# Patient Record
Sex: FEMALE | Race: OTHER | HISPANIC OR LATINO
[De-identification: names, ages, dates, MRNs, and addresses within clinical notes are randomized per-mention and may not be internally consistent; named-entity substitution may affect disease eponyms.]

---

## 2018-05-09 ENCOUNTER — APPOINTMENT (OUTPATIENT)
Dept: ANTEPARTUM | Facility: CLINIC | Age: 33
End: 2018-05-09

## 2018-05-14 ENCOUNTER — APPOINTMENT (OUTPATIENT)
Dept: ANTEPARTUM | Facility: CLINIC | Age: 33
End: 2018-05-14

## 2018-05-21 ENCOUNTER — APPOINTMENT (OUTPATIENT)
Dept: ANTEPARTUM | Facility: CLINIC | Age: 33
End: 2018-05-21
Payer: MEDICAID

## 2018-05-21 ENCOUNTER — ASOB RESULT (OUTPATIENT)
Age: 33
End: 2018-05-21

## 2018-05-21 PROCEDURE — 76801 OB US < 14 WKS SINGLE FETUS: CPT

## 2018-05-22 ENCOUNTER — APPOINTMENT (OUTPATIENT)
Dept: ANTEPARTUM | Facility: CLINIC | Age: 33
End: 2018-05-22
Payer: MEDICAID

## 2018-05-22 ENCOUNTER — APPOINTMENT (OUTPATIENT)
Dept: MATERNAL FETAL MEDICINE | Facility: CLINIC | Age: 33
End: 2018-05-22
Payer: MEDICAID

## 2018-05-22 ENCOUNTER — OTHER (OUTPATIENT)
Age: 33
End: 2018-05-22

## 2018-05-22 VITALS
OXYGEN SATURATION: 98 % | HEART RATE: 86 BPM | BODY MASS INDEX: 36.99 KG/M2 | WEIGHT: 201 LBS | SYSTOLIC BLOOD PRESSURE: 98 MMHG | HEIGHT: 62 IN | DIASTOLIC BLOOD PRESSURE: 56 MMHG | RESPIRATION RATE: 18 BRPM

## 2018-05-22 DIAGNOSIS — Z83.3 FAMILY HISTORY OF DIABETES MELLITUS: ICD-10-CM

## 2018-05-22 DIAGNOSIS — O99.212 OBESITY COMPLICATING PREGNANCY, SECOND TRIMESTER: ICD-10-CM

## 2018-05-22 DIAGNOSIS — Z3A.14 14 WEEKS GESTATION OF PREGNANCY: ICD-10-CM

## 2018-05-22 DIAGNOSIS — Z86.711 PERSONAL HISTORY OF PULMONARY EMBOLISM: ICD-10-CM

## 2018-05-22 PROCEDURE — 99213 OFFICE O/P EST LOW 20 MIN: CPT

## 2018-05-22 PROCEDURE — 76801 OB US < 14 WKS SINGLE FETUS: CPT

## 2018-05-22 RX ORDER — VITAMIN C, CALCIUM, IRON, VITAMIN D3, VITAMIN E, VITAMIN B1, VITAMIN B2, VITAMIN B3, VITAMIN B6, FOLIC ACID, IODINE, ZINC, COPPER, DOCUSATE SODIUM, DOCOSAHEXAENOIC ACID (DHA) 27-1-50 MG
KIT ORAL
Refills: 0 | Status: ACTIVE | COMMUNITY

## 2018-05-22 RX ORDER — ISOPROPYL ALCOHOL 70 ML/100ML
SWAB TOPICAL
Qty: 1 | Refills: 1 | Status: ACTIVE | COMMUNITY
Start: 2018-05-22 | End: 1900-01-01

## 2018-06-06 ENCOUNTER — APPOINTMENT (OUTPATIENT)
Dept: ANTEPARTUM | Facility: CLINIC | Age: 33
End: 2018-06-06

## 2018-06-12 LAB
2ND TRIMESTER DATA: NORMAL
AFP PNL SERPL: NORMAL
AFP SERPL-ACNC: NORMAL
B-HCG FREE SERPL-MCNC: NORMAL
CLINICAL BIOCHEMIST REVIEW: NORMAL
INHIBIN A SERPL-MCNC: NORMAL
NOTES NTD: NORMAL
U ESTRIOL SERPL-SCNC: NORMAL

## 2018-06-19 ENCOUNTER — APPOINTMENT (OUTPATIENT)
Dept: ANTEPARTUM | Facility: CLINIC | Age: 33
End: 2018-06-19

## 2018-06-26 ENCOUNTER — ASOB RESULT (OUTPATIENT)
Age: 33
End: 2018-06-26

## 2018-06-26 ENCOUNTER — APPOINTMENT (OUTPATIENT)
Dept: ANTEPARTUM | Facility: CLINIC | Age: 33
End: 2018-06-26
Payer: MEDICAID

## 2018-06-26 PROCEDURE — 76805 OB US >/= 14 WKS SNGL FETUS: CPT

## 2018-08-13 ENCOUNTER — TRANSCRIPTION ENCOUNTER (OUTPATIENT)
Age: 33
End: 2018-08-13

## 2018-08-20 ENCOUNTER — OUTPATIENT (OUTPATIENT)
Dept: OUTPATIENT SERVICES | Facility: HOSPITAL | Age: 33
LOS: 1 days | End: 2018-08-20
Payer: COMMERCIAL

## 2018-08-20 DIAGNOSIS — O26.893 OTHER SPECIFIED PREGNANCY RELATED CONDITIONS, THIRD TRIMESTER: ICD-10-CM

## 2018-08-21 ENCOUNTER — OTHER (OUTPATIENT)
Age: 33
End: 2018-08-21

## 2018-08-21 PROCEDURE — T1013: CPT

## 2018-08-21 PROCEDURE — 80053 COMPREHEN METABOLIC PANEL: CPT

## 2018-08-21 PROCEDURE — 76819 FETAL BIOPHYS PROFIL W/O NST: CPT

## 2018-08-21 PROCEDURE — 36415 COLL VENOUS BLD VENIPUNCTURE: CPT

## 2018-08-21 PROCEDURE — 76815 OB US LIMITED FETUS(S): CPT

## 2018-08-21 PROCEDURE — 85027 COMPLETE CBC AUTOMATED: CPT

## 2018-08-21 PROCEDURE — 76805 OB US >/= 14 WKS SNGL FETUS: CPT

## 2018-08-21 PROCEDURE — 82731 ASSAY OF FETAL FIBRONECTIN: CPT

## 2018-08-21 PROCEDURE — 81001 URINALYSIS AUTO W/SCOPE: CPT

## 2018-09-05 ENCOUNTER — APPOINTMENT (OUTPATIENT)
Dept: ANTEPARTUM | Facility: CLINIC | Age: 33
End: 2018-09-05
Payer: MEDICAID

## 2018-09-05 ENCOUNTER — ASOB RESULT (OUTPATIENT)
Age: 33
End: 2018-09-05

## 2018-09-05 PROCEDURE — 76816 OB US FOLLOW-UP PER FETUS: CPT

## 2018-09-05 PROCEDURE — 76819 FETAL BIOPHYS PROFIL W/O NST: CPT

## 2018-10-04 ENCOUNTER — ASOB RESULT (OUTPATIENT)
Age: 33
End: 2018-10-04

## 2018-10-04 ENCOUNTER — APPOINTMENT (OUTPATIENT)
Dept: ANTEPARTUM | Facility: CLINIC | Age: 33
End: 2018-10-04
Payer: MEDICAID

## 2018-10-04 ENCOUNTER — APPOINTMENT (OUTPATIENT)
Dept: MATERNAL FETAL MEDICINE | Facility: CLINIC | Age: 33
End: 2018-10-04

## 2018-10-04 VITALS
BODY MASS INDEX: 39.67 KG/M2 | DIASTOLIC BLOOD PRESSURE: 62 MMHG | WEIGHT: 215.56 LBS | SYSTOLIC BLOOD PRESSURE: 110 MMHG | OXYGEN SATURATION: 98 % | RESPIRATION RATE: 16 BRPM | HEART RATE: 82 BPM | HEIGHT: 62 IN

## 2018-10-04 DIAGNOSIS — E66.9 OBESITY, UNSPECIFIED: ICD-10-CM

## 2018-10-04 PROCEDURE — 76819 FETAL BIOPHYS PROFIL W/O NST: CPT

## 2018-10-04 PROCEDURE — 76816 OB US FOLLOW-UP PER FETUS: CPT

## 2018-10-04 RX ORDER — CONTAINER,EMPTY
EACH MISCELLANEOUS
Qty: 1 | Refills: 3 | Status: ACTIVE | COMMUNITY
Start: 2018-10-04 | End: 1900-01-01

## 2018-10-04 RX ORDER — SYRINGE WITH NEEDLE, 1 ML 27GX1/2"
27G X 1/2" SYRINGE, EMPTY DISPOSABLE MISCELLANEOUS
Qty: 1 | Refills: 1 | Status: ACTIVE | COMMUNITY
Start: 2018-10-04 | End: 1900-01-01

## 2018-10-04 RX ORDER — HEPARIN SODIUM 5000 [USP'U]/ML
5000 INJECTION, SOLUTION INTRAVENOUS; SUBCUTANEOUS
Qty: 60 | Refills: 1 | Status: ACTIVE | COMMUNITY
Start: 2018-10-04 | End: 1900-01-01

## 2018-10-10 ENCOUNTER — APPOINTMENT (OUTPATIENT)
Dept: ANTEPARTUM | Facility: CLINIC | Age: 33
End: 2018-10-10
Payer: MEDICAID

## 2018-10-10 ENCOUNTER — ASOB RESULT (OUTPATIENT)
Age: 33
End: 2018-10-10

## 2018-10-10 PROCEDURE — 76815 OB US LIMITED FETUS(S): CPT

## 2018-10-10 PROCEDURE — 76818 FETAL BIOPHYS PROFILE W/NST: CPT

## 2018-10-16 ENCOUNTER — APPOINTMENT (OUTPATIENT)
Dept: ANTEPARTUM | Facility: CLINIC | Age: 33
End: 2018-10-16

## 2018-10-17 ENCOUNTER — ASOB RESULT (OUTPATIENT)
Age: 33
End: 2018-10-17

## 2018-10-17 ENCOUNTER — APPOINTMENT (OUTPATIENT)
Dept: ANTEPARTUM | Facility: CLINIC | Age: 33
End: 2018-10-17
Payer: MEDICAID

## 2018-10-17 PROCEDURE — 76815 OB US LIMITED FETUS(S): CPT

## 2018-10-17 PROCEDURE — 76819 FETAL BIOPHYS PROFIL W/O NST: CPT

## 2018-10-23 ENCOUNTER — APPOINTMENT (OUTPATIENT)
Dept: ANTEPARTUM | Facility: CLINIC | Age: 33
End: 2018-10-23

## 2018-10-24 ENCOUNTER — ASOB RESULT (OUTPATIENT)
Age: 33
End: 2018-10-24

## 2018-10-24 ENCOUNTER — APPOINTMENT (OUTPATIENT)
Dept: ANTEPARTUM | Facility: CLINIC | Age: 33
End: 2018-10-24
Payer: MEDICAID

## 2018-10-24 PROCEDURE — 76819 FETAL BIOPHYS PROFIL W/O NST: CPT

## 2018-10-24 PROCEDURE — 76816 OB US FOLLOW-UP PER FETUS: CPT

## 2018-10-25 ENCOUNTER — OUTPATIENT (OUTPATIENT)
Dept: OUTPATIENT SERVICES | Facility: HOSPITAL | Age: 33
LOS: 1 days | End: 2018-10-25
Payer: COMMERCIAL

## 2018-10-25 DIAGNOSIS — Z01.818 ENCOUNTER FOR OTHER PREPROCEDURAL EXAMINATION: ICD-10-CM

## 2018-10-25 DIAGNOSIS — O47.1 FALSE LABOR AT OR AFTER 37 COMPLETED WEEKS OF GESTATION: ICD-10-CM

## 2018-10-25 LAB
APPEARANCE UR: CLEAR — SIGNIFICANT CHANGE UP
APTT BLD: 26.4 SEC — LOW (ref 27.5–37.4)
BASOPHILS # BLD AUTO: 0 K/UL — SIGNIFICANT CHANGE UP (ref 0–0.2)
BASOPHILS NFR BLD AUTO: 0.1 % — SIGNIFICANT CHANGE UP (ref 0–2)
BILIRUB UR-MCNC: NEGATIVE — SIGNIFICANT CHANGE UP
BLD GP AB SCN SERPL QL: SIGNIFICANT CHANGE UP
COLOR SPEC: YELLOW — SIGNIFICANT CHANGE UP
DIFF PNL FLD: ABNORMAL
EOSINOPHIL # BLD AUTO: 0 K/UL — SIGNIFICANT CHANGE UP (ref 0–0.5)
EOSINOPHIL NFR BLD AUTO: 0.4 % — SIGNIFICANT CHANGE UP (ref 0–6)
EPI CELLS # UR: SIGNIFICANT CHANGE UP
GLUCOSE UR QL: NEGATIVE MG/DL — SIGNIFICANT CHANGE UP
HCT VFR BLD CALC: 36.4 % — LOW (ref 37–47)
HGB BLD-MCNC: 12.1 G/DL — SIGNIFICANT CHANGE UP (ref 12–16)
HIV 1 & 2 AB SERPL IA.RAPID: SIGNIFICANT CHANGE UP
INR BLD: 0.99 RATIO — SIGNIFICANT CHANGE UP (ref 0.88–1.16)
KETONES UR-MCNC: NEGATIVE — SIGNIFICANT CHANGE UP
LEUKOCYTE ESTERASE UR-ACNC: ABNORMAL
LYMPHOCYTES # BLD AUTO: 1.7 K/UL — SIGNIFICANT CHANGE UP (ref 1–4.8)
LYMPHOCYTES # BLD AUTO: 22.2 % — SIGNIFICANT CHANGE UP (ref 20–55)
MCHC RBC-ENTMCNC: 29.2 PG — SIGNIFICANT CHANGE UP (ref 27–31)
MCHC RBC-ENTMCNC: 33.2 G/DL — SIGNIFICANT CHANGE UP (ref 32–36)
MCV RBC AUTO: 87.9 FL — SIGNIFICANT CHANGE UP (ref 81–99)
MONOCYTES # BLD AUTO: 0.6 K/UL — SIGNIFICANT CHANGE UP (ref 0–0.8)
MONOCYTES NFR BLD AUTO: 7.9 % — SIGNIFICANT CHANGE UP (ref 3–10)
NEUTROPHILS # BLD AUTO: 5.4 K/UL — SIGNIFICANT CHANGE UP (ref 1.8–8)
NEUTROPHILS NFR BLD AUTO: 68.9 % — SIGNIFICANT CHANGE UP (ref 37–73)
NITRITE UR-MCNC: NEGATIVE — SIGNIFICANT CHANGE UP
PH UR: 7 — SIGNIFICANT CHANGE UP (ref 5–8)
PLATELET # BLD AUTO: 306 K/UL — SIGNIFICANT CHANGE UP (ref 150–400)
PROT UR-MCNC: NEGATIVE MG/DL — SIGNIFICANT CHANGE UP
PROTHROM AB SERPL-ACNC: 10.9 SEC — SIGNIFICANT CHANGE UP (ref 9.8–12.7)
RBC # BLD: 4.14 M/UL — LOW (ref 4.4–5.2)
RBC # FLD: 14.5 % — SIGNIFICANT CHANGE UP (ref 11–15.6)
RBC CASTS # UR COMP ASSIST: SIGNIFICANT CHANGE UP /HPF (ref 0–4)
SP GR SPEC: 1.01 — SIGNIFICANT CHANGE UP (ref 1.01–1.02)
T PALLIDUM AB TITR SER: NEGATIVE — SIGNIFICANT CHANGE UP
TYPE + AB SCN PNL BLD: SIGNIFICANT CHANGE UP
UROBILINOGEN FLD QL: NEGATIVE MG/DL — SIGNIFICANT CHANGE UP
WBC # BLD: 7.8 K/UL — SIGNIFICANT CHANGE UP (ref 4.8–10.8)
WBC # FLD AUTO: 7.8 K/UL — SIGNIFICANT CHANGE UP (ref 4.8–10.8)
WBC UR QL: SIGNIFICANT CHANGE UP

## 2018-10-25 PROCEDURE — 85610 PROTHROMBIN TIME: CPT

## 2018-10-25 PROCEDURE — 86901 BLOOD TYPING SEROLOGIC RH(D): CPT

## 2018-10-25 PROCEDURE — 86703 HIV-1/HIV-2 1 RESULT ANTBDY: CPT

## 2018-10-25 PROCEDURE — 86780 TREPONEMA PALLIDUM: CPT

## 2018-10-25 PROCEDURE — 85730 THROMBOPLASTIN TIME PARTIAL: CPT

## 2018-10-25 PROCEDURE — 36415 COLL VENOUS BLD VENIPUNCTURE: CPT

## 2018-10-25 PROCEDURE — 85027 COMPLETE CBC AUTOMATED: CPT

## 2018-10-25 PROCEDURE — G0463: CPT

## 2018-10-25 PROCEDURE — 81001 URINALYSIS AUTO W/SCOPE: CPT

## 2018-10-25 PROCEDURE — 86900 BLOOD TYPING SEROLOGIC ABO: CPT

## 2018-10-25 PROCEDURE — 86850 RBC ANTIBODY SCREEN: CPT

## 2018-10-30 ENCOUNTER — APPOINTMENT (OUTPATIENT)
Dept: MATERNAL FETAL MEDICINE | Facility: CLINIC | Age: 33
End: 2018-10-30
Payer: MEDICAID

## 2018-10-30 ENCOUNTER — APPOINTMENT (OUTPATIENT)
Dept: ANTEPARTUM | Facility: CLINIC | Age: 33
End: 2018-10-30
Payer: MEDICAID

## 2018-10-30 ENCOUNTER — ASOB RESULT (OUTPATIENT)
Age: 33
End: 2018-10-30

## 2018-10-30 VITALS
HEIGHT: 62 IN | BODY MASS INDEX: 41.04 KG/M2 | DIASTOLIC BLOOD PRESSURE: 66 MMHG | SYSTOLIC BLOOD PRESSURE: 102 MMHG | WEIGHT: 223 LBS

## 2018-10-30 DIAGNOSIS — Z86.711 PERSONAL HISTORY OF PULMONARY EMBOLISM: ICD-10-CM

## 2018-10-30 PROCEDURE — ZZZZZ: CPT

## 2018-10-30 PROCEDURE — 76818 FETAL BIOPHYS PROFILE W/NST: CPT

## 2018-10-30 PROCEDURE — 99215 OFFICE O/P EST HI 40 MIN: CPT

## 2018-10-30 RX ORDER — ENOXAPARIN SODIUM 100 MG/ML
40 INJECTION SUBCUTANEOUS
Qty: 3 | Refills: 3 | Status: DISCONTINUED | COMMUNITY
Start: 2018-05-22 | End: 2018-10-30

## 2018-11-05 ENCOUNTER — OUTPATIENT (OUTPATIENT)
Dept: OUTPATIENT SERVICES | Facility: HOSPITAL | Age: 33
LOS: 1 days | End: 2018-11-05
Payer: COMMERCIAL

## 2018-11-05 DIAGNOSIS — O47.1 FALSE LABOR AT OR AFTER 37 COMPLETED WEEKS OF GESTATION: ICD-10-CM

## 2018-11-05 PROCEDURE — G0463: CPT

## 2018-11-05 PROCEDURE — 59050 FETAL MONITOR W/REPORT: CPT

## 2018-11-05 PROCEDURE — 59025 FETAL NON-STRESS TEST: CPT

## 2018-11-05 PROCEDURE — T1013: CPT

## 2018-11-07 ENCOUNTER — TRANSCRIPTION ENCOUNTER (OUTPATIENT)
Age: 33
End: 2018-11-07

## 2018-11-07 ENCOUNTER — APPOINTMENT (OUTPATIENT)
Dept: ANTEPARTUM | Facility: CLINIC | Age: 33
End: 2018-11-07

## 2018-11-08 ENCOUNTER — INPATIENT (INPATIENT)
Facility: HOSPITAL | Age: 33
LOS: 1 days | Discharge: HOME | End: 2018-11-10
Attending: OBSTETRICS & GYNECOLOGY | Admitting: OBSTETRICS & GYNECOLOGY
Payer: COMMERCIAL

## 2018-11-08 ENCOUNTER — RESULT REVIEW (OUTPATIENT)
Age: 33
End: 2018-11-08

## 2018-11-08 VITALS — HEIGHT: 63 IN | WEIGHT: 213.85 LBS

## 2018-11-08 DIAGNOSIS — O47.1 FALSE LABOR AT OR AFTER 37 COMPLETED WEEKS OF GESTATION: ICD-10-CM

## 2018-11-08 LAB
APTT BLD: 28.4 SEC — SIGNIFICANT CHANGE UP (ref 27.5–36.3)
BASE EXCESS BLDCOV CALC-SCNC: -3.1 MMOL/L — LOW (ref -2–2)
BLD GP AB SCN SERPL QL: SIGNIFICANT CHANGE UP
GAS PNL BLDCOV: 7.31 — SIGNIFICANT CHANGE UP (ref 7.25–7.45)
HCO3 BLDCOV-SCNC: 21 MMOL/L — SIGNIFICANT CHANGE UP (ref 21–29)
INR BLD: 1.02 RATIO — SIGNIFICANT CHANGE UP (ref 0.88–1.16)
PCO2 BLDCOV: 47.4 MMHG — SIGNIFICANT CHANGE UP (ref 29–53)
PO2 BLDCOA: 22.1 MMHG — SIGNIFICANT CHANGE UP (ref 17–41)
PROTHROM AB SERPL-ACNC: 11.7 SEC — SIGNIFICANT CHANGE UP (ref 10–12.9)
SAO2 % BLDCOV: SIGNIFICANT CHANGE UP
TYPE + AB SCN PNL BLD: SIGNIFICANT CHANGE UP

## 2018-11-08 PROCEDURE — 88302 TISSUE EXAM BY PATHOLOGIST: CPT | Mod: 26

## 2018-11-08 PROCEDURE — 88305 TISSUE EXAM BY PATHOLOGIST: CPT | Mod: 26

## 2018-11-08 RX ORDER — SODIUM CHLORIDE 9 MG/ML
1000 INJECTION, SOLUTION INTRAVENOUS
Qty: 0 | Refills: 0 | Status: DISCONTINUED | OUTPATIENT
Start: 2018-11-08 | End: 2018-11-09

## 2018-11-08 RX ORDER — SODIUM CHLORIDE 9 MG/ML
1000 INJECTION, SOLUTION INTRAVENOUS ONCE
Qty: 0 | Refills: 0 | Status: DISCONTINUED | OUTPATIENT
Start: 2018-11-08 | End: 2018-11-08

## 2018-11-08 RX ORDER — OXYCODONE AND ACETAMINOPHEN 5; 325 MG/1; MG/1
2 TABLET ORAL EVERY 6 HOURS
Qty: 0 | Refills: 0 | Status: DISCONTINUED | OUTPATIENT
Start: 2018-11-08 | End: 2018-11-10

## 2018-11-08 RX ORDER — OXYCODONE AND ACETAMINOPHEN 5; 325 MG/1; MG/1
1 TABLET ORAL
Qty: 0 | Refills: 0 | Status: DISCONTINUED | OUTPATIENT
Start: 2018-11-08 | End: 2018-11-10

## 2018-11-08 RX ORDER — METOCLOPRAMIDE HCL 10 MG
10 TABLET ORAL ONCE
Qty: 0 | Refills: 0 | Status: DISCONTINUED | OUTPATIENT
Start: 2018-11-08 | End: 2018-11-08

## 2018-11-08 RX ORDER — DIPHENHYDRAMINE HCL 50 MG
25 CAPSULE ORAL EVERY 6 HOURS
Qty: 0 | Refills: 0 | Status: DISCONTINUED | OUTPATIENT
Start: 2018-11-08 | End: 2018-11-10

## 2018-11-08 RX ORDER — TETANUS TOXOID, REDUCED DIPHTHERIA TOXOID AND ACELLULAR PERTUSSIS VACCINE, ADSORBED 5; 2.5; 8; 8; 2.5 [IU]/.5ML; [IU]/.5ML; UG/.5ML; UG/.5ML; UG/.5ML
0.5 SUSPENSION INTRAMUSCULAR ONCE
Qty: 0 | Refills: 0 | Status: COMPLETED | OUTPATIENT
Start: 2018-11-08

## 2018-11-08 RX ORDER — ENOXAPARIN SODIUM 100 MG/ML
40 INJECTION SUBCUTANEOUS DAILY
Qty: 0 | Refills: 0 | Status: DISCONTINUED | OUTPATIENT
Start: 2018-11-09 | End: 2018-11-10

## 2018-11-08 RX ORDER — SODIUM CHLORIDE 9 MG/ML
1000 INJECTION, SOLUTION INTRAVENOUS
Qty: 0 | Refills: 0 | Status: DISCONTINUED | OUTPATIENT
Start: 2018-11-08 | End: 2018-11-08

## 2018-11-08 RX ORDER — KETOROLAC TROMETHAMINE 30 MG/ML
30 SYRINGE (ML) INJECTION ONCE
Qty: 0 | Refills: 0 | Status: DISCONTINUED | OUTPATIENT
Start: 2018-11-08 | End: 2018-11-08

## 2018-11-08 RX ORDER — CITRIC ACID/SODIUM CITRATE 300-500 MG
30 SOLUTION, ORAL ORAL ONCE
Qty: 0 | Refills: 0 | Status: DISCONTINUED | OUTPATIENT
Start: 2018-11-08 | End: 2018-11-08

## 2018-11-08 RX ORDER — DOCUSATE SODIUM 100 MG
100 CAPSULE ORAL
Qty: 0 | Refills: 0 | Status: DISCONTINUED | OUTPATIENT
Start: 2018-11-08 | End: 2018-11-10

## 2018-11-08 RX ORDER — SODIUM CHLORIDE 9 MG/ML
1000 INJECTION, SOLUTION INTRAVENOUS
Qty: 0 | Refills: 0 | Status: DISCONTINUED | OUTPATIENT
Start: 2018-11-08 | End: 2018-11-10

## 2018-11-08 RX ORDER — ONDANSETRON 8 MG/1
4 TABLET, FILM COATED ORAL ONCE
Qty: 0 | Refills: 0 | Status: COMPLETED | OUTPATIENT
Start: 2018-11-08 | End: 2018-11-08

## 2018-11-08 RX ORDER — OXYTOCIN 10 UNIT/ML
333.33 VIAL (ML) INJECTION
Qty: 20 | Refills: 0 | Status: DISCONTINUED | OUTPATIENT
Start: 2018-11-08 | End: 2018-11-10

## 2018-11-08 RX ORDER — LANOLIN
1 OINTMENT (GRAM) TOPICAL
Qty: 0 | Refills: 0 | Status: DISCONTINUED | OUTPATIENT
Start: 2018-11-08 | End: 2018-11-10

## 2018-11-08 RX ORDER — IBUPROFEN 200 MG
600 TABLET ORAL EVERY 6 HOURS
Qty: 0 | Refills: 0 | Status: DISCONTINUED | OUTPATIENT
Start: 2018-11-08 | End: 2018-11-10

## 2018-11-08 RX ORDER — GLYCERIN ADULT
1 SUPPOSITORY, RECTAL RECTAL AT BEDTIME
Qty: 0 | Refills: 0 | Status: DISCONTINUED | OUTPATIENT
Start: 2018-11-08 | End: 2018-11-10

## 2018-11-08 RX ORDER — SIMETHICONE 80 MG/1
80 TABLET, CHEWABLE ORAL EVERY 4 HOURS
Qty: 0 | Refills: 0 | Status: DISCONTINUED | OUTPATIENT
Start: 2018-11-08 | End: 2018-11-10

## 2018-11-08 RX ORDER — FERROUS SULFATE 325(65) MG
325 TABLET ORAL DAILY
Qty: 0 | Refills: 0 | Status: DISCONTINUED | OUTPATIENT
Start: 2018-11-08 | End: 2018-11-10

## 2018-11-08 RX ORDER — OXYTOCIN 10 UNIT/ML
41.67 VIAL (ML) INJECTION
Qty: 20 | Refills: 0 | Status: DISCONTINUED | OUTPATIENT
Start: 2018-11-08 | End: 2018-11-10

## 2018-11-08 RX ORDER — CEFAZOLIN SODIUM 1 G
2000 VIAL (EA) INJECTION ONCE
Qty: 0 | Refills: 0 | Status: COMPLETED | OUTPATIENT
Start: 2018-11-08 | End: 2018-11-08

## 2018-11-08 RX ORDER — OXYTOCIN 10 UNIT/ML
41.67 VIAL (ML) INJECTION
Qty: 20 | Refills: 0 | Status: DISCONTINUED | OUTPATIENT
Start: 2018-11-08 | End: 2018-11-09

## 2018-11-08 RX ADMIN — Medication 100 MILLIGRAM(S): at 19:51

## 2018-11-08 RX ADMIN — Medication 1000 MILLIUNIT(S)/MIN: at 22:10

## 2018-11-08 RX ADMIN — SODIUM CHLORIDE 125 MILLILITER(S): 9 INJECTION, SOLUTION INTRAVENOUS at 19:25

## 2018-11-08 RX ADMIN — Medication 30 MILLIGRAM(S): at 23:13

## 2018-11-08 RX ADMIN — Medication 125 MILLIUNIT(S)/MIN: at 22:10

## 2018-11-08 RX ADMIN — ONDANSETRON 4 MILLIGRAM(S): 8 TABLET, FILM COATED ORAL at 23:22

## 2018-11-09 LAB
BASOPHILS # BLD AUTO: 0 K/UL — SIGNIFICANT CHANGE UP (ref 0–0.2)
BASOPHILS NFR BLD AUTO: 0.1 % — SIGNIFICANT CHANGE UP (ref 0–2)
EOSINOPHIL # BLD AUTO: 0 K/UL — SIGNIFICANT CHANGE UP (ref 0–0.5)
EOSINOPHIL NFR BLD AUTO: 0 % — SIGNIFICANT CHANGE UP (ref 0–6)
HCT VFR BLD CALC: 32.9 % — LOW (ref 37–47)
HGB BLD-MCNC: 10.7 G/DL — LOW (ref 12–16)
LYMPHOCYTES # BLD AUTO: 1.8 K/UL — SIGNIFICANT CHANGE UP (ref 1–4.8)
LYMPHOCYTES # BLD AUTO: 16.4 % — LOW (ref 20–55)
MCHC RBC-ENTMCNC: 28.6 PG — SIGNIFICANT CHANGE UP (ref 27–31)
MCHC RBC-ENTMCNC: 32.5 G/DL — SIGNIFICANT CHANGE UP (ref 32–36)
MCV RBC AUTO: 88 FL — SIGNIFICANT CHANGE UP (ref 81–99)
MONOCYTES # BLD AUTO: 0.6 K/UL — SIGNIFICANT CHANGE UP (ref 0–0.8)
MONOCYTES NFR BLD AUTO: 5.4 % — SIGNIFICANT CHANGE UP (ref 3–10)
NEUTROPHILS # BLD AUTO: 8.4 K/UL — HIGH (ref 1.8–8)
NEUTROPHILS NFR BLD AUTO: 77.8 % — HIGH (ref 37–73)
PLATELET # BLD AUTO: 273 K/UL — SIGNIFICANT CHANGE UP (ref 150–400)
RBC # BLD: 3.74 M/UL — LOW (ref 4.4–5.2)
RBC # FLD: 14.6 % — SIGNIFICANT CHANGE UP (ref 11–15.6)
T PALLIDUM AB TITR SER: NEGATIVE — SIGNIFICANT CHANGE UP
WBC # BLD: 10.7 K/UL — SIGNIFICANT CHANGE UP (ref 4.8–10.8)
WBC # FLD AUTO: 10.7 K/UL — SIGNIFICANT CHANGE UP (ref 4.8–10.8)

## 2018-11-09 RX ORDER — KETOROLAC TROMETHAMINE 30 MG/ML
30 SYRINGE (ML) INJECTION EVERY 6 HOURS
Qty: 0 | Refills: 0 | Status: DISCONTINUED | OUTPATIENT
Start: 2018-11-09 | End: 2018-11-10

## 2018-11-09 RX ORDER — INFLUENZA VIRUS VACCINE 15; 15; 15; 15 UG/.5ML; UG/.5ML; UG/.5ML; UG/.5ML
0.5 SUSPENSION INTRAMUSCULAR ONCE
Qty: 0 | Refills: 0 | Status: COMPLETED | OUTPATIENT
Start: 2018-11-09 | End: 2018-11-10

## 2018-11-09 RX ORDER — TETANUS TOXOID, REDUCED DIPHTHERIA TOXOID AND ACELLULAR PERTUSSIS VACCINE, ADSORBED 5; 2.5; 8; 8; 2.5 [IU]/.5ML; [IU]/.5ML; UG/.5ML; UG/.5ML; UG/.5ML
0.5 SUSPENSION INTRAMUSCULAR ONCE
Qty: 0 | Refills: 0 | Status: COMPLETED | OUTPATIENT
Start: 2018-11-09 | End: 2018-11-10

## 2018-11-09 RX ADMIN — Medication 30 MILLIGRAM(S): at 06:09

## 2018-11-09 RX ADMIN — Medication 600 MILLIGRAM(S): at 23:57

## 2018-11-09 RX ADMIN — Medication 30 MILLIGRAM(S): at 06:24

## 2018-11-09 RX ADMIN — SIMETHICONE 80 MILLIGRAM(S): 80 TABLET, CHEWABLE ORAL at 14:42

## 2018-11-09 RX ADMIN — Medication 125 MILLIUNIT(S)/MIN: at 00:13

## 2018-11-09 RX ADMIN — Medication 325 MILLIGRAM(S): at 14:42

## 2018-11-09 RX ADMIN — SODIUM CHLORIDE 125 MILLILITER(S): 9 INJECTION, SOLUTION INTRAVENOUS at 05:42

## 2018-11-09 RX ADMIN — Medication 1 TABLET(S): at 14:42

## 2018-11-09 RX ADMIN — ENOXAPARIN SODIUM 40 MILLIGRAM(S): 100 INJECTION SUBCUTANEOUS at 14:42

## 2018-11-09 RX ADMIN — Medication 100 MILLIGRAM(S): at 14:42

## 2018-11-09 NOTE — PROGRESS NOTE ADULT - SUBJECTIVE AND OBJECTIVE BOX
Postpartum Note,  Section  She is a  32y woman who is now post-operative day 1    Subjective:  The patient feels well.  She has ambulated and is tolerating a diet  She is having  flatus, but no BM yet  She reports no breathing problems  She reports no headache or visual changes  She reports normal postpartum bleeding    the wound had visible bleeding requiring a dressing change    Physical exam:  She generally looks and feels well    Vital Signs Last 24 Hrs  T(C): 36.9 (2018 05:28), Max: 37.1 (2018 23:10)  T(F): 98.5 (2018 05:28), Max: 98.8 (2018 23:10)  HR: 103 (2018 05:28) (69 - 103)  BP: 108/54 (2018 05:28) (101/57 - 111/65)  BP(mean): 72 (2018 23:10) (71 - 80)  RR: 18 (2018 05:28) (15 - 19)  SpO2: 95% (2018 05:28) (95% - 98%)    Lungs: Normal  Heart: Regular rate and rhythm  Abdomen: Soft, nontender, no distension , firm uterine fundus, the incision is clean dry and intact  Pelvic: Normal lochia noted  Ext: No DVT signs, warm extremities, normal pulses    LABS:                        10.7   10.7  )-----------( 273      ( 2018 06:29 )             32.9           PT/INR - ( 2018 19:13 )   PT: 11.7 sec;   INR: 1.02 ratio         PTT - ( 2018 19:13 )  PTT:28.4 sec      Allergies    No Known Allergies    Intolerances      MEDICATIONS  (STANDING):  diphtheria/tetanus/pertussis (acellular) Vaccine (ADAcel) 0.5 milliLiter(s) IntraMuscular once  enoxaparin Injectable 40 milliGRAM(s) SubCutaneous daily  ferrous    sulfate 325 milliGRAM(s) Oral daily  influenza   Vaccine 0.5 milliLiter(s) IntraMuscular once  lactated ringers. 1000 milliLiter(s) (125 mL/Hr) IV Continuous <Continuous>  oxytocin Infusion 41.667 milliUNIT(s)/Min (125 mL/Hr) IV Continuous <Continuous>  oxytocin Infusion 333.333 milliUNIT(s)/Min (1000 mL/Hr) IV Continuous <Continuous>  prenatal multivitamin 1 Tablet(s) Oral daily    MEDICATIONS  (PRN):  diphenhydrAMINE 25 milliGRAM(s) Oral every 6 hours PRN Itching  docusate sodium 100 milliGRAM(s) Oral two times a day PRN Stool Softening  glycerin Suppository - Adult 1 Suppository(s) Rectal at bedtime PRN Constipation  ibuprofen  Tablet. 600 milliGRAM(s) Oral every 6 hours PRN Mild Pain (1 - 3)  ketorolac   Injectable 30 milliGRAM(s) IV Push every 6 hours PRN Moderate Pain (4 - 6)  lanolin Ointment 1 Application(s) Topical every 3 hours PRN Sore Nipples  oxyCODONE    5 mG/acetaminophen 325 mG 1 Tablet(s) Oral every 3 hours PRN Moderate Pain (4 - 6)  oxyCODONE    5 mG/acetaminophen 325 mG 2 Tablet(s) Oral every 6 hours PRN Severe Pain (7 - 10)  simethicone 80 milliGRAM(s) Chew every 4 hours PRN Gas      RADIOLOGY & ADDITIONAL TESTS:

## 2018-11-09 NOTE — PROGRESS NOTE ADULT - PROBLEM SELECTOR PLAN 1
1. Routine post-partum care.  2. Encourage ambulation - if pt is not ambulating please use SCDs for DVT ppx.  3. Regular diet.  4. Encourage mother-baby interaction.  5. Plan for discharge tomorrow

## 2018-11-09 NOTE — PROGRESS NOTE ADULT - ASSESSMENT
Section Day: 1  She feels well  small amt of bleeding from the incision  dressing changed    Continue the current pain medication  Encourage Ambulation  Encourage regular diet   DVT ppx: SCDs  She is stable, tolerates a diet and has normal flatus but no BM yet  She will be discharged on Day 2, her request

## 2018-11-09 NOTE — PROGRESS NOTE ADULT - SUBJECTIVE AND OBJECTIVE BOX
Name: WILY HERNADEZ  MRN: [id number]  Date Admitted: 18  Location: [facility] 2E2017 ([facility] 2EST)  Attending: Nicole Ram    All: No Known Allergies    Post Partum Note:     WILY HERNADEZ is a 32y  s/p uncomplicated primary CS and R ovarian cystectomy and salpingectomy POD #1 due to 11cm ovarian cyst.    SUBJECTIVE:  No acute events overnight. Pain is well controlled with PRN pain medication. No problems with ambulating or PO intake. Ingram removed this AM - TOV pending. Has had flatus but no BM. Denies N/V. Patient is having normal lochia which is decreasing.      OBJECTIVE:  Physical exam:  General: AOx3, NAD.  Abdomen: Soft, appropriately tender, nondistended, no guarding or rebound tenderness, firm uterine fundus at umbilicus, dressing lightly saturated with serosanguinous fluid and removed, steristrips were saturated and replaced, the incision is clean, dry, and intact. No erythema or discharge.  Ext: No DVT signs, warm extremities.    Vital Signs Last 24 Hrs  T(C): 36.9 (2018 05:28), Max: 37.1 (2018 23:10)  T(F): 98.5 (2018 05:28), Max: 98.8 (2018 23:10)  HR: 103 (2018 05:28) (69 - 103)  BP: 108/54 (2018 05:28) (101/57 - 111/65)  BP(mean): 72 (2018 23:10) (71 - 80)  RR: 18 (2018 05:28) (15 - 19)  SpO2: 95% (2018 05:28) (95% - 98%)    LABS:                        10.7   10.7  )-----------( 273      ( 2018 06:29 )             32.9

## 2018-11-09 NOTE — PROGRESS NOTE ADULT - SUBJECTIVE AND OBJECTIVE BOX
INTERVAL HPI/OVERNIGHT EVENTS:  32y Female s/p c section under spinal anesthesia with duramorph for post op analgesia on 11/08/18     Vital Signs Last 24 Hrs  T(C): 37 (09 Nov 2018 12:01), Max: 37.1 (08 Nov 2018 23:10)  T(F): 98.6 (09 Nov 2018 12:01), Max: 98.8 (08 Nov 2018 23:10)  HR: 82 (09 Nov 2018 12:01) (69 - 103)  BP: 98/59 (09 Nov 2018 12:01) (94/55 - 111/65)  BP(mean): 72 (08 Nov 2018 23:10) (71 - 80)  RR: 16 (09 Nov 2018 12:01) (15 - 19)  SpO2: 95% (09 Nov 2018 09:36) (95% - 98%)    Patient seen, doing well, no anesthetic complications or complaints noted or reported.  Pain is controlled.

## 2018-11-09 NOTE — PROGRESS NOTE ADULT - ASSESSMENT
32y  s/p uncomplicated primary CS and R ovarian cystectomy and salpingectomy POD #1 due to 11cm ovarian cyst. Post-op CBC reviewed and WNL.   Pt is hemodynamically stable and meeting appropriate milestones. She desires to be discharged tomorrow, on POD #2.    TOV pending

## 2018-11-10 ENCOUNTER — TRANSCRIPTION ENCOUNTER (OUTPATIENT)
Age: 33
End: 2018-11-10

## 2018-11-10 VITALS
RESPIRATION RATE: 18 BRPM | DIASTOLIC BLOOD PRESSURE: 68 MMHG | SYSTOLIC BLOOD PRESSURE: 102 MMHG | HEART RATE: 91 BPM | TEMPERATURE: 98 F

## 2018-11-10 PROCEDURE — 59025 FETAL NON-STRESS TEST: CPT

## 2018-11-10 PROCEDURE — 36415 COLL VENOUS BLD VENIPUNCTURE: CPT

## 2018-11-10 PROCEDURE — 88305 TISSUE EXAM BY PATHOLOGIST: CPT

## 2018-11-10 PROCEDURE — G0463: CPT

## 2018-11-10 PROCEDURE — T1013: CPT

## 2018-11-10 PROCEDURE — 90715 TDAP VACCINE 7 YRS/> IM: CPT

## 2018-11-10 PROCEDURE — 88302 TISSUE EXAM BY PATHOLOGIST: CPT

## 2018-11-10 PROCEDURE — 90686 IIV4 VACC NO PRSV 0.5 ML IM: CPT

## 2018-11-10 PROCEDURE — 59050 FETAL MONITOR W/REPORT: CPT

## 2018-11-10 PROCEDURE — 85027 COMPLETE CBC AUTOMATED: CPT

## 2018-11-10 PROCEDURE — 86780 TREPONEMA PALLIDUM: CPT

## 2018-11-10 RX ORDER — ENOXAPARIN SODIUM 100 MG/ML
0 INJECTION SUBCUTANEOUS
Qty: 0 | Refills: 0 | COMMUNITY

## 2018-11-10 RX ORDER — DOCUSATE SODIUM 100 MG
1 CAPSULE ORAL
Qty: 30 | Refills: 0
Start: 2018-11-10

## 2018-11-10 RX ORDER — IBUPROFEN 200 MG
1 TABLET ORAL
Qty: 21 | Refills: 0
Start: 2018-11-10

## 2018-11-10 RX ORDER — HEPARIN SODIUM 5000 [USP'U]/ML
0 INJECTION INTRAVENOUS; SUBCUTANEOUS
Qty: 0 | Refills: 0 | COMMUNITY

## 2018-11-10 RX ADMIN — Medication 600 MILLIGRAM(S): at 00:57

## 2018-11-10 RX ADMIN — Medication 1 TABLET(S): at 11:21

## 2018-11-10 RX ADMIN — Medication 325 MILLIGRAM(S): at 11:21

## 2018-11-10 RX ADMIN — INFLUENZA VIRUS VACCINE 0.5 MILLILITER(S): 15; 15; 15; 15 SUSPENSION INTRAMUSCULAR at 06:12

## 2018-11-10 RX ADMIN — TETANUS TOXOID, REDUCED DIPHTHERIA TOXOID AND ACELLULAR PERTUSSIS VACCINE, ADSORBED 0.5 MILLILITER(S): 5; 2.5; 8; 8; 2.5 SUSPENSION INTRAMUSCULAR at 15:39

## 2018-11-10 RX ADMIN — ENOXAPARIN SODIUM 40 MILLIGRAM(S): 100 INJECTION SUBCUTANEOUS at 11:20

## 2018-11-10 RX ADMIN — Medication 600 MILLIGRAM(S): at 06:11

## 2018-11-10 RX ADMIN — Medication 100 MILLIGRAM(S): at 11:21

## 2018-11-10 NOTE — PROGRESS NOTE ADULT - SUBJECTIVE AND OBJECTIVE BOX
Postpartum Note,  Section  Patient is a 32y s/p  post-operative day 2.    Subjective:  No acute events overnight. The patient is feeling well.   She is tolerating a diet and denies N/V.    Patient is having normal postpartum bleeding which is decreasing in amount.    She is breastfeeding and the baby is latching on.    Urinating appropriately.   -BM/+flatus.    Physical exam:    Vital Signs Last 24 Hrs  T(C): 36.8 (2018 20:03), Max: 37 (2018 12:01)  T(F): 98.2 (2018 20:03), Max: 98.6 (2018 12:01)  HR: 83 (2018 20:03) (82 - 83)  BP: 105/61 (2018 20:03) (94/55 - 105/61)  RR: 18 (2018 20:03) (15 - 18)  SpO2: 95% (2018 09:36) (95% - 95%)    Heart: RRR  Lungs: CTABL  Breast: non tender, not engorged   Abdomen: Soft, nontender, no distension, firm uterine fundus, the incision is clean dry and intact  Ext: No DVT signs, warm extremities    LABS:                        10.7   10.7  )-----------( 273      ( 2018 06:29 )             32.9

## 2018-11-10 NOTE — DISCHARGE NOTE OB - CARE PLAN
Principal Discharge DX:	 delivery delivered  Goal:	rapid recovery  Assessment and plan of treatment:	please follow up with your OBGYN within 1 week for an incision check

## 2018-11-10 NOTE — DISCHARGE NOTE OB - CARE PROVIDER_API CALL
Nicole Ram (MD), Obstetrics and Gynecology  [address]  [address]  [place] [address]  Phone: [phone]  Fax: [phone]

## 2018-11-10 NOTE — DISCHARGE NOTE OB - HOSPITAL COURSE
Pt is a 31 yo  s/p primary  delivery, Pt is being discharged in a stable condition. to follow up with her primary OBGYN within a week for postoperative incision check.

## 2018-11-10 NOTE — DISCHARGE NOTE OB - PATIENT PORTAL LINK FT
You can access the [facility] Patient Portal, offered by [facility], by registering with the following website: [email or web address]

## 2018-11-10 NOTE — DISCHARGE NOTE OB - MEDICATION SUMMARY - MEDICATIONS TO TAKE
I will START or STAY ON the medications listed below when I get home from the hospital:    ibuprofen 600 mg oral tablet  -- 1 tab(s) by mouth every 6 hours, As needed, Mild Pain (1 - 3)  -- Indication: For pain    docusate sodium 100 mg oral capsule  -- 1 cap(s) by mouth 2 times a day, As needed, Stool Softening  -- Indication: For Constipation

## 2018-11-10 NOTE — PROGRESS NOTE ADULT - ASSESSMENT
Section Day: 2  Patient is feeling well overall.     Continue the current pain medication.   Encourage ambulation and regular diet.   Plan to discharge on day 3 or 4 according to the normal criteria.

## 2018-11-15 LAB — SURGICAL PATHOLOGY FINAL REPORT - CH: SIGNIFICANT CHANGE UP

## 2019-02-28 ENCOUNTER — APPOINTMENT (OUTPATIENT)
Age: 34
End: 2019-02-28

## 2020-04-06 ENCOUNTER — TRANSCRIPTION ENCOUNTER (OUTPATIENT)
Age: 35
End: 2020-04-06

## 2020-04-07 ENCOUNTER — RESULT REVIEW (OUTPATIENT)
Age: 35
End: 2020-04-07

## 2020-04-07 ENCOUNTER — INPATIENT (INPATIENT)
Facility: HOSPITAL | Age: 35
LOS: 1 days | Discharge: HOME | DRG: 341 | End: 2020-04-09
Attending: HOSPITALIST | Admitting: SURGERY
Payer: MEDICAID

## 2020-04-07 VITALS
RESPIRATION RATE: 18 BRPM | TEMPERATURE: 99 F | OXYGEN SATURATION: 97 % | DIASTOLIC BLOOD PRESSURE: 64 MMHG | HEIGHT: 62.99 IN | SYSTOLIC BLOOD PRESSURE: 96 MMHG | WEIGHT: 190.04 LBS | HEART RATE: 115 BPM

## 2020-04-07 DIAGNOSIS — K35.80 UNSPECIFIED ACUTE APPENDICITIS: ICD-10-CM

## 2020-04-07 LAB
ALBUMIN SERPL ELPH-MCNC: 4.2 G/DL — SIGNIFICANT CHANGE UP (ref 3.3–5.2)
ALP SERPL-CCNC: 48 U/L — SIGNIFICANT CHANGE UP (ref 40–120)
ALT FLD-CCNC: 21 U/L — SIGNIFICANT CHANGE UP
ANION GAP SERPL CALC-SCNC: 16 MMOL/L — SIGNIFICANT CHANGE UP (ref 5–17)
APPEARANCE UR: CLEAR — SIGNIFICANT CHANGE UP
APTT BLD: 30.4 SEC — SIGNIFICANT CHANGE UP (ref 27.5–36.3)
AST SERPL-CCNC: 26 U/L — SIGNIFICANT CHANGE UP
BACTERIA # UR AUTO: ABNORMAL
BASOPHILS # BLD AUTO: 0.01 K/UL — SIGNIFICANT CHANGE UP (ref 0–0.2)
BASOPHILS NFR BLD AUTO: 0.1 % — SIGNIFICANT CHANGE UP (ref 0–2)
BILIRUB SERPL-MCNC: 0.5 MG/DL — SIGNIFICANT CHANGE UP (ref 0.4–2)
BILIRUB UR-MCNC: NEGATIVE — SIGNIFICANT CHANGE UP
BLD GP AB SCN SERPL QL: SIGNIFICANT CHANGE UP
BUN SERPL-MCNC: 7 MG/DL — LOW (ref 8–20)
CALCIUM SERPL-MCNC: 9.1 MG/DL — SIGNIFICANT CHANGE UP (ref 8.6–10.2)
CHLORIDE SERPL-SCNC: 97 MMOL/L — LOW (ref 98–107)
CO2 SERPL-SCNC: 23 MMOL/L — SIGNIFICANT CHANGE UP (ref 22–29)
COLOR SPEC: YELLOW — SIGNIFICANT CHANGE UP
CREAT SERPL-MCNC: 0.77 MG/DL — SIGNIFICANT CHANGE UP (ref 0.5–1.3)
DIFF PNL FLD: ABNORMAL
EOSINOPHIL # BLD AUTO: 0 K/UL — SIGNIFICANT CHANGE UP (ref 0–0.5)
EOSINOPHIL NFR BLD AUTO: 0 % — SIGNIFICANT CHANGE UP (ref 0–6)
EPI CELLS # UR: SIGNIFICANT CHANGE UP
GLUCOSE SERPL-MCNC: 105 MG/DL — HIGH (ref 70–99)
GLUCOSE UR QL: NEGATIVE MG/DL — SIGNIFICANT CHANGE UP
HCG SERPL-ACNC: <4 MIU/ML — SIGNIFICANT CHANGE UP
HCG UR QL: NEGATIVE — SIGNIFICANT CHANGE UP
HCT VFR BLD CALC: 42 % — SIGNIFICANT CHANGE UP (ref 34.5–45)
HGB BLD-MCNC: 14.2 G/DL — SIGNIFICANT CHANGE UP (ref 11.5–15.5)
IMM GRANULOCYTES NFR BLD AUTO: 0.4 % — SIGNIFICANT CHANGE UP (ref 0–1.5)
INR BLD: 1.4 RATIO — HIGH (ref 0.88–1.16)
KETONES UR-MCNC: ABNORMAL
LEUKOCYTE ESTERASE UR-ACNC: NEGATIVE — SIGNIFICANT CHANGE UP
LIDOCAIN IGE QN: 21 U/L — LOW (ref 22–51)
LYMPHOCYTES # BLD AUTO: 2.19 K/UL — SIGNIFICANT CHANGE UP (ref 1–3.3)
LYMPHOCYTES # BLD AUTO: 20.4 % — SIGNIFICANT CHANGE UP (ref 13–44)
MCHC RBC-ENTMCNC: 30.3 PG — SIGNIFICANT CHANGE UP (ref 27–34)
MCHC RBC-ENTMCNC: 33.8 GM/DL — SIGNIFICANT CHANGE UP (ref 32–36)
MCV RBC AUTO: 89.7 FL — SIGNIFICANT CHANGE UP (ref 80–100)
MONOCYTES # BLD AUTO: 0.51 K/UL — SIGNIFICANT CHANGE UP (ref 0–0.9)
MONOCYTES NFR BLD AUTO: 4.8 % — SIGNIFICANT CHANGE UP (ref 2–14)
NEUTROPHILS # BLD AUTO: 7.97 K/UL — HIGH (ref 1.8–7.4)
NEUTROPHILS NFR BLD AUTO: 74.3 % — SIGNIFICANT CHANGE UP (ref 43–77)
NITRITE UR-MCNC: NEGATIVE — SIGNIFICANT CHANGE UP
PH UR: 7 — SIGNIFICANT CHANGE UP (ref 5–8)
PLATELET # BLD AUTO: 226 K/UL — SIGNIFICANT CHANGE UP (ref 150–400)
POTASSIUM SERPL-MCNC: 4.2 MMOL/L — SIGNIFICANT CHANGE UP (ref 3.5–5.3)
POTASSIUM SERPL-SCNC: 4.2 MMOL/L — SIGNIFICANT CHANGE UP (ref 3.5–5.3)
PROT SERPL-MCNC: 8.2 G/DL — SIGNIFICANT CHANGE UP (ref 6.6–8.7)
PROT UR-MCNC: 30 MG/DL
PROTHROM AB SERPL-ACNC: 16 SEC — HIGH (ref 10–12.9)
RBC # BLD: 4.68 M/UL — SIGNIFICANT CHANGE UP (ref 3.8–5.2)
RBC # FLD: 12.8 % — SIGNIFICANT CHANGE UP (ref 10.3–14.5)
RBC CASTS # UR COMP ASSIST: ABNORMAL /HPF (ref 0–4)
SARS-COV-2 RNA SPEC QL NAA+PROBE: DETECTED
SARS-COV-2 RNA SPEC QL NAA+PROBE: SIGNIFICANT CHANGE UP
SODIUM SERPL-SCNC: 136 MMOL/L — SIGNIFICANT CHANGE UP (ref 135–145)
SP GR SPEC: 1 — LOW (ref 1.01–1.02)
UROBILINOGEN FLD QL: NEGATIVE MG/DL — SIGNIFICANT CHANGE UP
WBC # BLD: 10.72 K/UL — HIGH (ref 3.8–10.5)
WBC # FLD AUTO: 10.72 K/UL — HIGH (ref 3.8–10.5)
WBC UR QL: SIGNIFICANT CHANGE UP

## 2020-04-07 PROCEDURE — 71045 X-RAY EXAM CHEST 1 VIEW: CPT | Mod: 26

## 2020-04-07 PROCEDURE — 99222 1ST HOSP IP/OBS MODERATE 55: CPT

## 2020-04-07 PROCEDURE — 74177 CT ABD & PELVIS W/CONTRAST: CPT | Mod: 26

## 2020-04-07 PROCEDURE — 93010 ELECTROCARDIOGRAM REPORT: CPT

## 2020-04-07 PROCEDURE — 88304 TISSUE EXAM BY PATHOLOGIST: CPT | Mod: 26

## 2020-04-07 PROCEDURE — 99285 EMERGENCY DEPT VISIT HI MDM: CPT

## 2020-04-07 RX ORDER — PIPERACILLIN AND TAZOBACTAM 4; .5 G/20ML; G/20ML
3.38 INJECTION, POWDER, LYOPHILIZED, FOR SOLUTION INTRAVENOUS ONCE
Refills: 0 | Status: DISCONTINUED | OUTPATIENT
Start: 2020-04-07 | End: 2020-04-07

## 2020-04-07 RX ORDER — SODIUM CHLORIDE 9 MG/ML
1000 INJECTION, SOLUTION INTRAVENOUS ONCE
Refills: 0 | Status: COMPLETED | OUTPATIENT
Start: 2020-04-07 | End: 2020-04-07

## 2020-04-07 RX ORDER — ONDANSETRON 8 MG/1
4 TABLET, FILM COATED ORAL EVERY 6 HOURS
Refills: 0 | Status: DISCONTINUED | OUTPATIENT
Start: 2020-04-07 | End: 2020-04-09

## 2020-04-07 RX ORDER — SODIUM CHLORIDE 9 MG/ML
1000 INJECTION, SOLUTION INTRAVENOUS
Refills: 0 | Status: DISCONTINUED | OUTPATIENT
Start: 2020-04-07 | End: 2020-04-07

## 2020-04-07 RX ORDER — ACETAMINOPHEN 500 MG
650 TABLET ORAL EVERY 6 HOURS
Refills: 0 | Status: DISCONTINUED | OUTPATIENT
Start: 2020-04-07 | End: 2020-04-08

## 2020-04-07 RX ORDER — SODIUM CHLORIDE 9 MG/ML
1000 INJECTION, SOLUTION INTRAVENOUS
Refills: 0 | Status: DISCONTINUED | OUTPATIENT
Start: 2020-04-07 | End: 2020-04-09

## 2020-04-07 RX ORDER — TRAMADOL HYDROCHLORIDE 50 MG/1
25 TABLET ORAL EVERY 4 HOURS
Refills: 0 | Status: DISCONTINUED | OUTPATIENT
Start: 2020-04-07 | End: 2020-04-09

## 2020-04-07 RX ORDER — ENOXAPARIN SODIUM 100 MG/ML
40 INJECTION SUBCUTANEOUS EVERY 24 HOURS
Refills: 0 | Status: DISCONTINUED | OUTPATIENT
Start: 2020-04-07 | End: 2020-04-09

## 2020-04-07 RX ORDER — CEFOTETAN DISODIUM 1 G
1 VIAL (EA) INJECTION EVERY 12 HOURS
Refills: 0 | Status: DISCONTINUED | OUTPATIENT
Start: 2020-04-07 | End: 2020-04-09

## 2020-04-07 RX ORDER — CEFOTETAN DISODIUM 1 G
VIAL (EA) INJECTION
Refills: 0 | Status: DISCONTINUED | OUTPATIENT
Start: 2020-04-07 | End: 2020-04-09

## 2020-04-07 RX ORDER — CEFOTETAN DISODIUM 1 G
1 VIAL (EA) INJECTION ONCE
Refills: 0 | Status: COMPLETED | OUTPATIENT
Start: 2020-04-07 | End: 2020-04-07

## 2020-04-07 RX ORDER — SENNA PLUS 8.6 MG/1
2 TABLET ORAL AT BEDTIME
Refills: 0 | Status: DISCONTINUED | OUTPATIENT
Start: 2020-04-07 | End: 2020-04-09

## 2020-04-07 RX ORDER — PANTOPRAZOLE SODIUM 20 MG/1
40 TABLET, DELAYED RELEASE ORAL
Refills: 0 | Status: DISCONTINUED | OUTPATIENT
Start: 2020-04-07 | End: 2020-04-09

## 2020-04-07 RX ORDER — KETOROLAC TROMETHAMINE 30 MG/ML
15 SYRINGE (ML) INJECTION ONCE
Refills: 0 | Status: DISCONTINUED | OUTPATIENT
Start: 2020-04-07 | End: 2020-04-07

## 2020-04-07 RX ORDER — FENTANYL CITRATE 50 UG/ML
25 INJECTION INTRAVENOUS
Refills: 0 | Status: DISCONTINUED | OUTPATIENT
Start: 2020-04-07 | End: 2020-04-07

## 2020-04-07 RX ORDER — GABAPENTIN 400 MG/1
100 CAPSULE ORAL THREE TIMES A DAY
Refills: 0 | Status: DISCONTINUED | OUTPATIENT
Start: 2020-04-07 | End: 2020-04-09

## 2020-04-07 RX ADMIN — Medication 100 GRAM(S): at 09:05

## 2020-04-07 RX ADMIN — Medication 100 GRAM(S): at 18:50

## 2020-04-07 RX ADMIN — SODIUM CHLORIDE 1000 MILLILITER(S): 9 INJECTION, SOLUTION INTRAVENOUS at 22:17

## 2020-04-07 RX ADMIN — SODIUM CHLORIDE 100 MILLILITER(S): 9 INJECTION, SOLUTION INTRAVENOUS at 22:12

## 2020-04-07 RX ADMIN — GABAPENTIN 100 MILLIGRAM(S): 400 CAPSULE ORAL at 22:10

## 2020-04-07 RX ADMIN — SENNA PLUS 2 TABLET(S): 8.6 TABLET ORAL at 22:10

## 2020-04-07 RX ADMIN — Medication 650 MILLIGRAM(S): at 22:11

## 2020-04-07 RX ADMIN — SODIUM CHLORIDE 100 MILLILITER(S): 9 INJECTION, SOLUTION INTRAVENOUS at 11:43

## 2020-04-07 RX ADMIN — TRAMADOL HYDROCHLORIDE 25 MILLIGRAM(S): 50 TABLET ORAL at 18:50

## 2020-04-07 RX ADMIN — FENTANYL CITRATE 25 MICROGRAM(S): 50 INJECTION INTRAVENOUS at 14:30

## 2020-04-07 RX ADMIN — Medication 15 MILLIGRAM(S): at 03:27

## 2020-04-07 RX ADMIN — ENOXAPARIN SODIUM 40 MILLIGRAM(S): 100 INJECTION SUBCUTANEOUS at 22:10

## 2020-04-07 RX ADMIN — SODIUM CHLORIDE 125 MILLILITER(S): 9 INJECTION, SOLUTION INTRAVENOUS at 22:16

## 2020-04-07 NOTE — ED PROVIDER NOTE - TEMPLATE, MLM
- Head CT/ MRI negative, symptoms have resolved  -NO ASPIRIN, family states emphatically that she cannot take aspirin  -lethargy and weakness may also have been from polypharmacy, d/c tramadol, d/c gabapentin, d/c flexeril  _ Neurology consult appreciated General - Head CT/ MRI negative, symptoms have resolved  -NO ASPIRIN, family states emphatically that she cannot take aspirin  -cont tramadol, d/c gabapentin, d/c flexeril  _ Neurology consult appreciated  -her neurological symptoms and signs have resolved  -MRI negative

## 2020-04-07 NOTE — ED ADULT NURSE REASSESSMENT NOTE - COMFORT CARE
side rails down/wait time explained/repositioned/treatment delay explained/warm blanket provided/plan of care explained

## 2020-04-07 NOTE — CHART NOTE - NSCHARTNOTEFT_GEN_A_CORE
POST OP CHECK    Patient seen and examined at bedside this AM. Pain well controlled. Denies fever, chills, nausea, vomiting, chest pain, SOB, dizziness, abd pain or any other concerning symptoms    Vital Signs Last 24 Hrs  T(C): 36.9 (2020 17:30), Max: 37.3 (2020 00:52)  T(F): 98.4 (2020 17:30), Max: 99.2 (2020 00:52)  HR: 97 (2020 17:30) (65 - 115)  BP: 107/70 (2020 17:30) (96/63 - 123/66)  BP(mean): --  RR: 18 (2020 17:30) (16 - 18)  SpO2: 94% (2020 17:30) (94% - 100%)    I&O's Detail    Constitutional: Croatian speaking patient resting comfortably in bed, in no acute distress  HEENT: EOMI / PERRL b/l  Neck: No JVD, full ROM without pain  Respiratory: Respirations are unlabored, no accessory muscle use, no conversational dyspnea  Cardiovascular: regular rate & rhythm  Gastrointestinal: Surgical site c/d/i with no strikethrough on dressing, abdomen soft, appropriately tender along surgical site, non-distended, no rebound tenderness / guarding   Neurological: GCS: 15 (4/5/6). A&O x 3; no gross sensory / motor / coordination deficits  Psychiatric: Normal mood, normal affect  Musculoskeletal: No joint pain, swelling or deformity     LABS:                        14.2   10.72 )-----------( 226      ( 2020 03:22 )             42.0     04-07    136  |  97<L>  |  7.0<L>  ----------------------------<  105<H>  4.2   |  23.0  |  0.77    Ca    9.1      2020 03:22    TPro  8.2  /  Alb  4.2  /  TBili  0.5  /  DBili  x   /  AST  26  /  ALT  21  /  AlkPhos  48  04-07    PT/INR - ( 2020 09:21 )   PT: 16.0 sec;   INR: 1.40 ratio         PTT - ( 2020 09:21 )  PTT:30.4 sec  Urinalysis Basic - ( 2020 11:27 )    Color: Yellow / Appearance: Clear / S.005 / pH: x  Gluc: x / Ketone: Small  / Bili: Negative / Urobili: Negative mg/dL   Blood: x / Protein: 30 mg/dL / Nitrite: Negative   Leuk Esterase: Negative / RBC: 3-5 /HPF / WBC 0-2   Sq Epi: x / Non Sq Epi: Occasional / Bacteria: Occasional    MEDICATIONS  (STANDING):  acetaminophen   Tablet .. 650 milliGRAM(s) Oral every 6 hours  cefoTEtan  IVPB      cefoTEtan  IVPB 1 Gram(s) IV Intermittent every 12 hours  enoxaparin Injectable 40 milliGRAM(s) SubCutaneous every 24 hours  gabapentin 100 milliGRAM(s) Oral three times a day  lactated ringers. 1000 milliLiter(s) (100 mL/Hr) IV Continuous <Continuous>  pantoprazole    Tablet 40 milliGRAM(s) Oral before breakfast  senna 2 Tablet(s) Oral at bedtime    MEDICATIONS  (PRN):  ondansetron Injectable 4 milliGRAM(s) IV Push every 6 hours PRN Nausea  traMADol 25 milliGRAM(s) Oral every 4 hours PRN Severe Pain (7 - 10)    MICRO:     STUDIES:     34 year old female POD#0 s/p lap appy for gangrenous appendicitis recently tested COVID positive    1. Appropriate COVID positive precautions instituted   2. Continue IV ABx for total duration of 4 days - f/u AM WBC to determine if can be switched to PO   3. Decrease IVF rate once tolerating more PO intake   4. Encourage ICS use and OOB to chair   5. Routine DVT PPx     Plan discussed with operating attending

## 2020-04-07 NOTE — H&P ADULT - NSHPLABSRESULTS_GEN_ALL_CORE
< from: CT Abdomen and Pelvis w/ IV Cont (04.07.20 @ 05:58) >  FINDINGS:    Patchy groundglass and confluent airspace opacities at the lung bases which may be due to atypical/viral pneumonia.    The liver, spleen, pancreas, gallbladder and biliary tree are unremarkable.    The kidneys enhance symmetrically without hydronephrosis. The adrenal glands are unremarkable.    There is no bowel obstruction or ascites. The appendix is dilated, measuring up to 1.6 cm with wall thickening and surrounding inflammatory change consistent with acute appendicitis.    The urinary bladder is unremarkable. There is no pelvic mass. An IUD is noted.    There is no abdominal or pelvic lymphadenopathy.    The aorta and IVC are unremarkable.    The visualized osseous structures are within normal limits.      IMPRESSION:    Acute appendicitis. No drainable collection.    Patchy groundglass and confluent airspace opacities at the lung bases which may be due to atypical/viral pneumonia.

## 2020-04-07 NOTE — ED PROVIDER NOTE - OBJECTIVE STATEMENT
Patient is a 35 y/o female with no pmhx presenting to the ed for rlq abdominal pain. pt states she has been in self isolation for covid sxs, recently sxs have resolved. pt states yesterday started with RLQ and vomiting non-bloody, non-bilious). pt admits to dysuria. pt denies cp, SOB, fevers, back pain

## 2020-04-07 NOTE — PROGRESS NOTE ADULT - SUBJECTIVE AND OBJECTIVE BOX
complains of some pain   afevbrile  abd soft milr RLQ tenderness  mild guiarding no rebound   EBC 10  CT multiple areas of opacities consistenet with vial p[rocess ? covid   Will try to mabnagemnt appendicitis conservativelt at present   With abx   If symptoms worsen may require surgiacl intervention

## 2020-04-07 NOTE — ED PROVIDER NOTE - ATTENDING CONTRIBUTION TO CARE
I personally saw the patient with the PA, and completed the key components of the history and physical exam. I then discussed the management plan with the PA.   gen in nad resp clear cardiac no murmur abd + ttp suprapbic rlq no g/r/r no cvat neuro intact   agree with pa plan of care

## 2020-04-07 NOTE — ED ADULT NURSE REASSESSMENT NOTE - NS ED NURSE REASSESS COMMENT FT1
Pt received in the stretcher resting comfortably ,no distress noted, no chest pain reported , awaiting surgery to see , will continue to monitor

## 2020-04-07 NOTE — ED ADULT TRIAGE NOTE - CHIEF COMPLAINT QUOTE
"I am having right lower abdominal pain and I vomited yesterday and I have pain and burning with urination and pain on my side is worse when I move. "  Pt A & Ox4,

## 2020-04-07 NOTE — CONSULT NOTE ADULT - SUBJECTIVE AND OBJECTIVE BOX
Surgery Consult Note    Patient is a 34y old  Female who presents with a chief complaint of RLQ pain     HPI: 34 year old female with PMH significant for PE in 2016 currently not on anticoagulation presenting to [facility] ED today with complaints of RLQ pain associated with nausea and emesis during the past 2 days. Patient states that the pain was first localized in the periumbilical region then migrated to the RLQ. Patient denies any fevers but does note Ageusia and anosmia for which she has been self-isolating at home concerned for COVID19 infection. Last menstrual cycle was . Last meal was yesterday. Patient denies fevers/chills, denies lightheadedness/dizziness, denies SOB/chest pain, denies constipation/diarrhea.      ROS: 10-system review is otherwise negative except HPI above.      PAST MEDICAL & SURGICAL HISTORY:  No pertinent past medical history  No significant past surgical history    FAMILY HISTORY:  No pertinent family history in first degree relatives    [] Family history not pertinent as reviewed with the patient and family    SOCIAL HISTORY:  denies toxic habits X3    ALLERGIES: No Known Allergies      HOME MEDICATIONS: Denies     CURRENT MEDICATIONS  MEDICATIONS (STANDING): cefoTEtan  IVPB        MEDICATIONS (PRN):  --------------------------------------------------------------------------------------------    Vitals:   T(C): 37.1 (20 @ 03:41), Max: 37.3 (20 @ 00:52)  HR: 86 (20 @ 03:41) (86 - 115)  BP: 96/63 (20 @ 03:41) (96/63 - 96/64)  RR: 17 (20 03:41) (17 - 18)  SpO2: 96% (20 @ 03:41) (96% - 97%)  CAPILLARY BLOOD GLUCOSE        CAPILLARY BLOOD GLUCOSE          Height (cm): 160 ( @ 00:52)  Weight (kg): 86.2 ( 00:52)  BMI (kg/m2): 33.7 ( 00:52)  BSA (m2): 1.89 ( 00:52)    PHYSICAL EXAM:   General: NAD, Lying in bed comfortably  Neuro: A+Ox3  HEENT: NC/AT, EOMI  Neck: Soft, supple  Cardio: RRR, nml S1/S2  Resp: Good effort, CTA b/l  Thorax: No chest wall tenderness  GI/Abd: Soft, positive Rovsing sign, RLQ tenderness ND, no rebound/guarding, no masses palpated  Vascular: All 4 extremities warm.  Skin: Intact, no breakdown  Lymphatic/Nodes: No palpable lymphadenopathy  Musculoskeletal: All 4 extremities moving spontaneously, no limitations  --------------------------------------------------------------------------------------------    LABS  CBC ( 03:22)                              14.2                           10.72<H>  )----------------(  226        74.3  % Neutrophils, 20.4  % Lymphocytes, ANC: 7.97<H>                              42.0      BMP ( 03:22)             136     |  97<L>   |  7.0<L>		Ca++ --      Ca 9.1                ---------------------------------( 105<H>		Mg --                 4.2     |  23.0    |  0.77  			Ph --        LFTs ( 03:22)      TPro 8.2 / Alb 4.2 / TBili 0.5 / DBili -- / AST 26 / ALT 21 / AlkPhos 48            --------------------------------------------------------------------------------------------    MICROBIOLOGY  Urinalysis ( @ 06:16):     Color: Yellow / Appearance: Clear / S.005<L> / pH: 7.0 / Gluc: Negative / Ketones: Small<!> / Bili: Negative / Urobili: Negative / Protein :30<!> / Nitrites: Negative / Leuk.Est: Negative / RBC:  / WBC:  / Sq Epi:  / Non Sq Epi:  / Bacteria          --------------------------------------------------------------------------------------------    IMAGING    < from: CT Abdomen and Pelvis w/ IV Cont (20 @ 05:58) >  FINDINGS:    Patchy groundglass and confluent airspace opacities at the lung bases which may be due to atypical/viral pneumonia.    The liver, spleen, pancreas, gallbladder and biliary tree are unremarkable.    The kidneys enhance symmetrically without hydronephrosis. The adrenal glands are unremarkable.    There is no bowel obstruction or ascites. The appendix is dilated, measuring up to 1.6 cm with wall thickening and surrounding inflammatory change consistent with acute appendicitis.    The urinary bladder is unremarkable. There is no pelvic mass. An IUD is noted.    There is no abdominal or pelvic lymphadenopathy.    The aorta and IVC are unremarkable.    The visualized osseous structures are within normal limits.      IMPRESSION:    Acute appendicitis. No drainable collection.    Patchy groundglass and confluent airspace opacities at the lung bases which may be due to atypical/viral pneumonia.    < end of copied text >

## 2020-04-07 NOTE — ED PROVIDER NOTE - PHYSICAL EXAMINATION
Const: Awake, alert and oriented. In no acute distress. Well appearing.  HEENT: NC/AT. Moist mucous membranes.  Eyes: No scleral icterus. EOMI.  Neck:. Soft and supple. Full ROM without pain.  Cardiac: +S1/S2. No murmurs. Peripheral pulses 2+ and symmetric. No LE edema.  Resp: Speaking in full sentences. No evidence of respiratory distress. No wheezes, rales or rhonchi.  Abd: Soft, RLQ tenderness on palpation, guarding    Back: Spine midline and non-tender. No CVAT.  Skin: No rashes, abrasions or lacerations.  Lymph: No cervical lymphadenopathy.  Neuro: Awake, alert & oriented x 3. Moves all extremities symmetrically.

## 2020-04-07 NOTE — H&P ADULT - HISTORY OF PRESENT ILLNESS
Patient is a 34y old  Female who presents with a chief complaint of RLQ pain     HPI: 34 year old female with PMH significant for PE in 2016 currently not on anticoagulation presenting to [facility] ED today with complaints of RLQ pain associated with nausea and emesis during the past 2 days. Patient states that the pain was first localized in the periumbilical region then migrated to the RLQ. Patient denies any fevers but does note Ageusia and anosmia for which she has been self-isolating at home concerned for COVID19 infection. Last menstrual cycle was 4/2. Last meal was yesterday. Patient denies fevers/chills, denies lightheadedness/dizziness, denies SOB/chest pain, denies constipation/diarrhea.      ROS: 10-system review is otherwise negative except HPI above.      PAST MEDICAL & SURGICAL HISTORY:  No pertinent past medical history  No significant past surgical history    FAMILY HISTORY:  No pertinent family history in first degree relatives    [] Family history not pertinent as reviewed with the patient and family    SOCIAL HISTORY:  denies toxic habits X3    ALLERGIES: No Known Allergies      HOME MEDICATIONS: Denies

## 2020-04-07 NOTE — PROGRESS NOTE ADULT - ASSESSMENT
34 year old female with PMH significant for PE in 2016 no longer on anticoagulation presenting to [facility] ED with cc of RLQ pain associated with nausea and emesis. Patient denies any fevers no sob, no cough. Does note Ageusia and anosmia for which she has been self-isolating at home concerned for COVID19 infection. Per pt her  is sick and not sure if is covid19+. Patient denies fevers/chills, denies lightheadedness/dizziness, denies SOB/chest pain, denies constipation/diarrhea.  Pt was admitted to surgical surgical for acute appendicitis as shown on CT scan. CT abd/pelvis also showed b/l base ground glass opacity but pt denies any respiratory sx or fevers. COVID 19 was tested today and negative.     1) Acute appendicitis  - Primary surgical team  - for OR today  - NPO   - IVF hydration  - on iv cefotetan    2) Bilateral lung base Ground glass opacity  - Pt has no sx at this time  - WBC is only 10.7  - no fevers, no sob, no cough  - COVID 19 was negative  - however suspicion is high for COVID19  - repeat COVID19 Swab am 4/8/20  - continue isolation for now  - if pt begins to have fevers, sob or cough consider starting Plaquenil 5 day regimen   - current o2 sat is 98% on room air    3) Prophylactic measure  - DVT ppx: Lovenox SQ    thank you for consult. Will follow.

## 2020-04-07 NOTE — H&P ADULT - NSHPPHYSICALEXAM_GEN_ALL_CORE
PHYSICAL EXAM:   General: NAD, Lying in bed comfortably  Neuro: A+Ox3  HEENT: NC/AT, EOMI  Neck: Soft, supple  Cardio: RRR, nml S1/S2  Resp: Good effort, CTA b/l  Thorax: No chest wall tenderness  GI/Abd: Soft, positive Rovsing sign, RLQ tenderness ND, no rebound/guarding, no masses palpated  Vascular: All 4 extremities warm.  Skin: Intact, no breakdown  Lymphatic/Nodes: No palpable lymphadenopathy  Musculoskeletal: All 4 extremities moving spontaneously, no limitations

## 2020-04-07 NOTE — BRIEF OPERATIVE NOTE - OPERATION/FINDINGS
Non perforated gangrenous appendix identified. Staple line examined with no evidence of bleeding. Hemostasis achieved prior to closure.

## 2020-04-07 NOTE — CONSULT NOTE ADULT - ASSESSMENT
ASSESSMENT: Patient is a 34y old f with acute appendicitis     PLAN:    - COVID19 symptoms, patient on contact/droplet precautions pending COVID19 PCR  - Cefotetan IV   - NPO/IVFs  - Type and Screen  - Plan discussed with Attending, Dr. Horowitz

## 2020-04-07 NOTE — PROGRESS NOTE ADULT - SUBJECTIVE AND OBJECTIVE BOX
HPI:  34 year old female with PMH significant for PE in 2016 no longer on anticoagulation presenting to [facility] ED with cc of RLQ pain associated with nausea and emesis. Patient denies any fevers no sob, no cough. Does note Ageusia and anosmia for which she has been self-isolating at home concerned for COVID19 infection. Per pt her  is sick and not sure if is covid19+. Patient denies fevers/chills, denies lightheadedness/dizziness, denies SOB/chest pain, denies constipation/diarrhea.  Pt was admitted to surgical surgical for acute appendicitis as shown on CT scan. CT abd/pelvis also showed b/l base ground glass opacity but pt denies any respiratory sx or fevers. COVID 19 was tested today and negative.     ROS: 10-system review is otherwise negative except HPI above.      PAST MEDICAL & SURGICAL HISTORY:  Pulmonary embolism: 2016   2018    FAMILY HISTORY:  Mother DM    SOCIAL HISTORY:  denies alcohol, tobacco or drug abuse    ALLERGIES: No Known Allergies    HOME MEDICATIONS:   no home meds    MEDICATIONS  (STANDING):  acetaminophen   Tablet .. 650 milliGRAM(s) Oral every 6 hours  cefoTEtan  IVPB      cefoTEtan  IVPB 1 Gram(s) IV Intermittent every 12 hours  enoxaparin Injectable 40 milliGRAM(s) SubCutaneous every 24 hours  lactated ringers. 1000 milliLiter(s) (100 mL/Hr) IV Continuous <Continuous>  pantoprazole    Tablet 40 milliGRAM(s) Oral before breakfast  senna 2 Tablet(s) Oral at bedtime    MEDICATIONS  (PRN):  ondansetron Injectable 4 milliGRAM(s) IV Push every 6 hours PRN Nausea      Vital Signs Last 24 Hrs  T(C): 37.1 (2020 08:11), Max: 37.3 (2020 00:52)  T(F): 98.7 (2020 08:11), Max: 99.2 (2020 00:52)  HR: 78 (2020 08:11) (78 - 115)  BP: 100/65 (2020 08:11) (96/63 - 100/65)  BP(mean): --  RR: 18 (2020 08:11) (17 - 18)  SpO2: 98% (2020 08:11) (96% - 98%)    PHYSICAL EXAM:    GENERAL: NAD, well-groomed, well-developed  HEAD:  Atraumatic, Normocephalic  EYES: EOMI, PERRLA, conjunctiva and sclera clear  NECK: Supple, No JVD, Normal thyroid  NERVOUS SYSTEM:  Alert & Oriented X3, Good concentration; Motor Strength 5/5 B/L upper and lower extremities  CHEST/LUNG: decreased BS at bases, no rales, rhonchi, wheezing  HEART: Regular rate and rhythm; No murmurs  ABDOMEN: Soft, Nontender, Nondistended; Bowel sounds present  EXTREMITIES:  2+ Peripheral Pulses, No clubbing, cyanosis, or edema ,     LABS:                        14.2   10.72 )-----------( 226      ( 2020 03:22 )             42.0     04-07    136  |  97<L>  |  7.0<L>  ----------------------------<  105<H>  4.2   |  23.0  |  0.77    Ca    9.1      2020 03:22    TPro  8.2  /  Alb  4.2  /  TBili  0.5  /  DBili  x   /  AST  26  /  ALT  21  /  AlkPhos  48  04-07    PT/INR - ( 2020 09:21 )   PT: 16.0 sec;   INR: 1.40 ratio         PTT - ( 2020 09:21 )  PTT:30.4 sec  Urinalysis Basic - ( 2020 06:16 )    Color: Yellow / Appearance: Clear / S.005 / pH: x  Gluc: x / Ketone: Small  / Bili: Negative / Urobili: Negative mg/dL   Blood: x / Protein: 30 mg/dL / Nitrite: Negative   Leuk Esterase: Negative / RBC: 3-5 /HPF / WBC 0-2   Sq Epi: x / Non Sq Epi: Occasional / Bacteria: Negative

## 2020-04-08 ENCOUNTER — TRANSCRIPTION ENCOUNTER (OUTPATIENT)
Age: 35
End: 2020-04-08

## 2020-04-08 DIAGNOSIS — K35.31 ACUTE APPENDICITIS WITH LOCALIZED PERITONITIS AND GANGRENE, WITHOUT PERFORATION: ICD-10-CM

## 2020-04-08 DIAGNOSIS — U07.1 COVID-19: ICD-10-CM

## 2020-04-08 LAB
ANION GAP SERPL CALC-SCNC: 14 MMOL/L — SIGNIFICANT CHANGE UP (ref 5–17)
BASOPHILS # BLD AUTO: 0.01 K/UL — SIGNIFICANT CHANGE UP (ref 0–0.2)
BASOPHILS NFR BLD AUTO: 0.1 % — SIGNIFICANT CHANGE UP (ref 0–2)
BUN SERPL-MCNC: 8 MG/DL — SIGNIFICANT CHANGE UP (ref 8–20)
CALCIUM SERPL-MCNC: 8.9 MG/DL — SIGNIFICANT CHANGE UP (ref 8.6–10.2)
CHLORIDE SERPL-SCNC: 101 MMOL/L — SIGNIFICANT CHANGE UP (ref 98–107)
CO2 SERPL-SCNC: 23 MMOL/L — SIGNIFICANT CHANGE UP (ref 22–29)
CREAT SERPL-MCNC: 0.55 MG/DL — SIGNIFICANT CHANGE UP (ref 0.5–1.3)
CRP SERPL-MCNC: 23.13 MG/DL — HIGH (ref 0–0.4)
CULTURE RESULTS: SIGNIFICANT CHANGE UP
EOSINOPHIL # BLD AUTO: 0 K/UL — SIGNIFICANT CHANGE UP (ref 0–0.5)
EOSINOPHIL NFR BLD AUTO: 0 % — SIGNIFICANT CHANGE UP (ref 0–6)
FERRITIN SERPL-MCNC: 277 NG/ML — HIGH (ref 15–150)
GLUCOSE SERPL-MCNC: 103 MG/DL — HIGH (ref 70–99)
HCT VFR BLD CALC: 37.6 % — SIGNIFICANT CHANGE UP (ref 34.5–45)
HCT VFR BLD CALC: 37.6 % — SIGNIFICANT CHANGE UP (ref 34.5–45)
HGB BLD-MCNC: 12.6 G/DL — SIGNIFICANT CHANGE UP (ref 11.5–15.5)
HGB BLD-MCNC: 12.6 G/DL — SIGNIFICANT CHANGE UP (ref 11.5–15.5)
IMM GRANULOCYTES NFR BLD AUTO: 0.4 % — SIGNIFICANT CHANGE UP (ref 0–1.5)
LDH SERPL L TO P-CCNC: 188 U/L — SIGNIFICANT CHANGE UP (ref 98–192)
LYMPHOCYTES # BLD AUTO: 1.59 K/UL — SIGNIFICANT CHANGE UP (ref 1–3.3)
LYMPHOCYTES # BLD AUTO: 16.3 % — SIGNIFICANT CHANGE UP (ref 13–44)
MAGNESIUM SERPL-MCNC: 2.1 MG/DL — SIGNIFICANT CHANGE UP (ref 1.8–2.6)
MCHC RBC-ENTMCNC: 30.2 PG — SIGNIFICANT CHANGE UP (ref 27–34)
MCHC RBC-ENTMCNC: 30.2 PG — SIGNIFICANT CHANGE UP (ref 27–34)
MCHC RBC-ENTMCNC: 33.5 GM/DL — SIGNIFICANT CHANGE UP (ref 32–36)
MCHC RBC-ENTMCNC: 33.5 GM/DL — SIGNIFICANT CHANGE UP (ref 32–36)
MCV RBC AUTO: 90.2 FL — SIGNIFICANT CHANGE UP (ref 80–100)
MCV RBC AUTO: 90.2 FL — SIGNIFICANT CHANGE UP (ref 80–100)
MONOCYTES # BLD AUTO: 0.45 K/UL — SIGNIFICANT CHANGE UP (ref 0–0.9)
MONOCYTES NFR BLD AUTO: 4.6 % — SIGNIFICANT CHANGE UP (ref 2–14)
NEUTROPHILS # BLD AUTO: 7.68 K/UL — HIGH (ref 1.8–7.4)
NEUTROPHILS NFR BLD AUTO: 78.6 % — HIGH (ref 43–77)
PHOSPHATE SERPL-MCNC: 2.2 MG/DL — LOW (ref 2.4–4.7)
PLATELET # BLD AUTO: 225 K/UL — SIGNIFICANT CHANGE UP (ref 150–400)
PLATELET # BLD AUTO: 225 K/UL — SIGNIFICANT CHANGE UP (ref 150–400)
POTASSIUM SERPL-MCNC: 3.7 MMOL/L — SIGNIFICANT CHANGE UP (ref 3.5–5.3)
POTASSIUM SERPL-SCNC: 3.7 MMOL/L — SIGNIFICANT CHANGE UP (ref 3.5–5.3)
PROCALCITONIN SERPL-MCNC: 0.19 NG/ML — HIGH (ref 0.02–0.1)
RBC # BLD: 4.17 M/UL — SIGNIFICANT CHANGE UP (ref 3.8–5.2)
RBC # BLD: 4.17 M/UL — SIGNIFICANT CHANGE UP (ref 3.8–5.2)
RBC # FLD: 13.2 % — SIGNIFICANT CHANGE UP (ref 10.3–14.5)
RBC # FLD: 13.2 % — SIGNIFICANT CHANGE UP (ref 10.3–14.5)
SODIUM SERPL-SCNC: 138 MMOL/L — SIGNIFICANT CHANGE UP (ref 135–145)
SPECIMEN SOURCE: SIGNIFICANT CHANGE UP
WBC # BLD: 9.77 K/UL — SIGNIFICANT CHANGE UP (ref 3.8–10.5)
WBC # BLD: 9.77 K/UL — SIGNIFICANT CHANGE UP (ref 3.8–10.5)
WBC # FLD AUTO: 9.77 K/UL — SIGNIFICANT CHANGE UP (ref 3.8–10.5)
WBC # FLD AUTO: 9.77 K/UL — SIGNIFICANT CHANGE UP (ref 3.8–10.5)

## 2020-04-08 PROCEDURE — 99231 SBSQ HOSP IP/OBS SF/LOW 25: CPT | Mod: GC

## 2020-04-08 PROCEDURE — 93010 ELECTROCARDIOGRAM REPORT: CPT

## 2020-04-08 PROCEDURE — 99233 SBSQ HOSP IP/OBS HIGH 50: CPT

## 2020-04-08 RX ORDER — ACETAMINOPHEN 500 MG
650 TABLET ORAL EVERY 6 HOURS
Refills: 0 | Status: DISCONTINUED | OUTPATIENT
Start: 2020-04-08 | End: 2020-04-09

## 2020-04-08 RX ORDER — ASCORBIC ACID 60 MG
1000 TABLET,CHEWABLE ORAL DAILY
Refills: 0 | Status: DISCONTINUED | OUTPATIENT
Start: 2020-04-09 | End: 2020-04-09

## 2020-04-08 RX ORDER — HYDROXYCHLOROQUINE SULFATE 200 MG
400 TABLET ORAL EVERY 12 HOURS
Refills: 0 | Status: COMPLETED | OUTPATIENT
Start: 2020-04-08 | End: 2020-04-08

## 2020-04-08 RX ORDER — HYDROXYCHLOROQUINE SULFATE 200 MG
200 TABLET ORAL EVERY 12 HOURS
Refills: 0 | Status: DISCONTINUED | OUTPATIENT
Start: 2020-04-09 | End: 2020-04-09

## 2020-04-08 RX ORDER — HYDROXYCHLOROQUINE SULFATE 200 MG
TABLET ORAL
Refills: 0 | Status: DISCONTINUED | OUTPATIENT
Start: 2020-04-08 | End: 2020-04-09

## 2020-04-08 RX ORDER — ALBUTEROL 90 UG/1
2 AEROSOL, METERED ORAL EVERY 6 HOURS
Refills: 0 | Status: DISCONTINUED | OUTPATIENT
Start: 2020-04-08 | End: 2020-04-09

## 2020-04-08 RX ORDER — THIAMINE MONONITRATE (VIT B1) 100 MG
200 TABLET ORAL DAILY
Refills: 0 | Status: DISCONTINUED | OUTPATIENT
Start: 2020-04-09 | End: 2020-04-09

## 2020-04-08 RX ORDER — CHOLECALCIFEROL (VITAMIN D3) 125 MCG
1000 CAPSULE ORAL DAILY
Refills: 0 | Status: DISCONTINUED | OUTPATIENT
Start: 2020-04-08 | End: 2020-04-09

## 2020-04-08 RX ORDER — SODIUM,POTASSIUM PHOSPHATES 278-250MG
1 POWDER IN PACKET (EA) ORAL THREE TIMES A DAY
Refills: 0 | Status: DISCONTINUED | OUTPATIENT
Start: 2020-04-08 | End: 2020-04-09

## 2020-04-08 RX ORDER — ASCORBIC ACID 60 MG
1000 TABLET,CHEWABLE ORAL ONCE
Refills: 0 | Status: COMPLETED | OUTPATIENT
Start: 2020-04-08 | End: 2020-04-08

## 2020-04-08 RX ORDER — THIAMINE MONONITRATE (VIT B1) 100 MG
200 TABLET ORAL ONCE
Refills: 0 | Status: COMPLETED | OUTPATIENT
Start: 2020-04-08 | End: 2020-04-08

## 2020-04-08 RX ADMIN — GABAPENTIN 100 MILLIGRAM(S): 400 CAPSULE ORAL at 20:36

## 2020-04-08 RX ADMIN — Medication 100 GRAM(S): at 18:45

## 2020-04-08 RX ADMIN — SODIUM CHLORIDE 75 MILLILITER(S): 9 INJECTION, SOLUTION INTRAVENOUS at 18:45

## 2020-04-08 RX ADMIN — TRAMADOL HYDROCHLORIDE 25 MILLIGRAM(S): 50 TABLET ORAL at 00:19

## 2020-04-08 RX ADMIN — GABAPENTIN 100 MILLIGRAM(S): 400 CAPSULE ORAL at 11:58

## 2020-04-08 RX ADMIN — Medication 1000 MILLIGRAM(S): at 11:56

## 2020-04-08 RX ADMIN — SENNA PLUS 2 TABLET(S): 8.6 TABLET ORAL at 20:36

## 2020-04-08 RX ADMIN — Medication 100 MILLIGRAM(S): at 11:57

## 2020-04-08 RX ADMIN — Medication 200 MILLIGRAM(S): at 11:57

## 2020-04-08 RX ADMIN — Medication 100 GRAM(S): at 04:18

## 2020-04-08 RX ADMIN — ENOXAPARIN SODIUM 40 MILLIGRAM(S): 100 INJECTION SUBCUTANEOUS at 20:37

## 2020-04-08 RX ADMIN — Medication 1 PACKET(S): at 20:37

## 2020-04-08 RX ADMIN — TRAMADOL HYDROCHLORIDE 25 MILLIGRAM(S): 50 TABLET ORAL at 04:32

## 2020-04-08 RX ADMIN — Medication 650 MILLIGRAM(S): at 04:18

## 2020-04-08 RX ADMIN — GABAPENTIN 100 MILLIGRAM(S): 400 CAPSULE ORAL at 04:18

## 2020-04-08 RX ADMIN — Medication 400 MILLIGRAM(S): at 11:56

## 2020-04-08 RX ADMIN — PANTOPRAZOLE SODIUM 40 MILLIGRAM(S): 20 TABLET, DELAYED RELEASE ORAL at 04:18

## 2020-04-08 RX ADMIN — Medication 400 MILLIGRAM(S): at 20:36

## 2020-04-08 NOTE — DISCHARGE NOTE PROVIDER - HOSPITAL COURSE
HPI on Admission: 34 year old female with PMH significant for PE in 2016 currently not on anticoagulation presenting to [facility] ED today with complaints of RLQ pain associated with nausea and emesis during the past 2 days. Patient states that the pain was first localized in the periumbilical region then migrated to the RLQ. Patient denies any fevers but does note Ageusia and anosmia for which she has been self-isolating at home concerned for COVID19 infection. Last menstrual cycle was 4/2. Last meal was yesterday. Patient denies fevers/chills, denies lightheadedness/dizziness, denies SOB/chest pain, denies constipation/diarrhea.          Hospital Course: Admitted with acute appendicitis, initial COVID testing negative though CT AP imaging including inferior chest and was notable for Ground glass opacities of the bilateral lower lobes consistent with COVID infection.  Taken for uncomplicated Laparoscopic Appendectomy 4/7/2020 which revealed gangrenous nonperforated nonsupperative appendicitis.  Evening of POD 0 febrile to 101.4F, presumed secondary to COVID.        Transferred to the medical service POD 1 for COVID management. HPI on Admission: 34 year old female with PMH significant for PE in 2016 currently not on anticoagulation presenting to [facility] ED  on 4/7/ 20  with complaints of RLQ pain associated with nausea and emesis during the past 2 days. Patient states that the pain was first localized in the periumbilical region then migrated to the RLQ. Patient denies any fevers but does note Ageusia and anosmia for which she has been self-isolating at home concerned for COVID19 infection.  Patient denies fevers/chills, denies lightheadedness/dizziness, denies SOB/chest pain, denies constipation/diarrhea.          Hospital Course: Admitted with acute appendicitis, initial COVID testing negative though CT AP imaging including inferior chest and was notable for Ground glass opacities of the bilateral lower lobes consistent with COVID infection. Repeated COVID - 19 ON 4/7/20 positive .  Taken for uncomplicated Laparoscopic Appendectomy 4/7/2020 which revealed gangrenous nonperforated nonsuppurative appendicitis. Started on Plaquenil for COVID ,     treated with iv abx perioperatively , afebrile > 48 hrs .    Patient seen and examined . S/p  , POD #         CC : HPI on Admission: 34 year old female with PMH significant for PE in 2016 currently not on anticoagulation presenting to [facility] ED  on 4/7/ 20  with complaints of RLQ pain associated with nausea and emesis during the past 2 days. Patient states that the pain was first localized in the periumbilical region then migrated to the RLQ. Patient denies any fevers but does note Ageusia and anosmia for which she has been self-isolating at home concerned for COVID19 infection.  Patient denies fevers/chills, denies lightheadedness/dizziness, denies SOB/chest pain, denies constipation/diarrhea.          Hospital Course: Admitted with acute appendicitis, initial COVID testing negative though CT AP imaging including inferior chest and was notable for Ground glass opacities of the bilateral lower lobes consistent with COVID infection. Repeated COVID - 19 ON 4/7/20 positive .  Taken for uncomplicated Laparoscopic Appendectomy 4/7/2020 which revealed gangrenous nonperforated nonsuppurative appendicitis. Started on Plaquenil for COVID ,     treated with iv abx perioperatively , afebrile > 48 hrs .    Patient seen and examined . Comfortable , no pain , no sob , appetite is good , had BM today         CC : abdominal pain - well controlled             Vital Signs Last 24 Hrs    T(C): 37.4 (09 Apr 2020 10:25), Max: 37.4 (09 Apr 2020 04:44)    T(F): 99.3 (09 Apr 2020 10:25), Max: 99.4 (09 Apr 2020 04:44)    HR: 67 (09 Apr 2020 10:25) (67 - 80)    BP: 101/66 (09 Apr 2020 10:25) (101/66 - 124/78)    BP(mean): --    RR: 19 (09 Apr 2020 10:25) (19 - 20)    SpO2: 93% (09 Apr 2020 10:25) (91% - 93%)    PHYSICAL EXAM:        GENERAL: NAD, well-groomed, well-developed    HEAD:  Atraumatic, Normocephalic    EYES: EOMI, PERRLA, conjunctiva and sclera clear    NECK: Supple, No JVD, Normal thyroid    NERVOUS SYSTEM:  Alert & Oriented X3, no focal deficit    CHEST/LUNG: CTA b/l ,  no  rales, rhonchi, wheezing, or rubs    HEART: Regular rate and rhythm; No murmurs, rubs, or gallops    ABDOMEN: Soft, Nontender, Nondistended; Bowel sounds present    EXTREMITIES:  2+ Peripheral Pulses, No clubbing, cyanosis, or edema    LYMPH: No lymphadenopathy noted    SKIN: No rashes or lesions        It has been determined that you no longer need hospitalization and can recover while remaining in self-quarantine at home for 14 days. You should follow the prevention steps below until a healthcare provider or [facility] says you can return to your normal activities.  Please remain in quarantine until then. You should restrict activities outside your home except for getting medical care.  Do not go to work, school or public areas.  Avoid using public transportation.  Separate yourself from other people and animals in your home.  Cover your coughs and sneezes.  Clean your hands often. Avoid sharing personal household items. Clean all high touch surfaces. Monitor your symptoms, if you have a medical emergency call 911.            More than > 30 min spent with patient , note , nurse . HPI on Admission: 34 year old female with PMH significant for PE in 2016 currently not on anticoagulation presenting to [facility] ED  on 4/7/ 20  with complaints of RLQ pain associated with nausea and emesis during the past 2 days. Patient states that the pain was first localized in the periumbilical region then migrated to the RLQ. Patient denies any fevers but does note Ageusia and anosmia for which she has been self-isolating at home concerned for COVID19 infection.  Patient denies fevers/chills, denies lightheadedness/dizziness, denies SOB/chest pain, denies constipation/diarrhea.          Hospital Course: Admitted with acute appendicitis, initial COVID testing negative though CT AP imaging including inferior chest and was notable for Ground glass opacities of the bilateral lower lobes consistent with COVID infection. Repeated COVID - 19 ON 4/7/20 positive .  Taken for uncomplicated Laparoscopic Appendectomy 4/7/2020 which revealed gangrenous nonperforated nonsuppurative appendicitis. Started on Plaquenil for COVID ,     treated with iv abx perioperatively , afebrile > 48 hrs . Saturating well on RA , O2 sat with ambulation 95%     Patient seen and examined . Comfortable , no pain , no sob , appetite is good , had BM today         CC : abdominal pain - well controlled             Vital Signs Last 24 Hrs    T(C): 37.4 (09 Apr 2020 10:25), Max: 37.4 (09 Apr 2020 04:44)    T(F): 99.3 (09 Apr 2020 10:25), Max: 99.4 (09 Apr 2020 04:44)    HR: 67 (09 Apr 2020 10:25) (67 - 80)    BP: 101/66 (09 Apr 2020 10:25) (101/66 - 124/78)    BP(mean): --    RR: 19 (09 Apr 2020 10:25) (19 - 20)    SpO2: 93% (09 Apr 2020 10:25) (91% - 93%)    PHYSICAL EXAM:        GENERAL: NAD, well-groomed, well-developed    HEAD:  Atraumatic, Normocephalic    EYES: EOMI, PERRLA, conjunctiva and sclera clear    NECK: Supple, No JVD, Normal thyroid    NERVOUS SYSTEM:  Alert & Oriented X3, no focal deficit    CHEST/LUNG: CTA b/l ,  no  rales, rhonchi, wheezing, or rubs    HEART: Regular rate and rhythm; No murmurs, rubs, or gallops    ABDOMEN: Soft, Nontender, Nondistended; Bowel sounds present    EXTREMITIES:  2+ Peripheral Pulses, No clubbing, cyanosis, or edema    LYMPH: No lymphadenopathy noted    SKIN: No rashes or lesions        It has been determined that you no longer need hospitalization and can recover while remaining in self-quarantine at home for 14 days. You should follow the prevention steps below until a healthcare provider or [facility] says you can return to your normal activities.  Please remain in quarantine until then. You should restrict activities outside your home except for getting medical care.  Do not go to work, school or public areas.  Avoid using public transportation.  Separate yourself from other people and animals in your home.  Cover your coughs and sneezes.  Clean your hands often. Avoid sharing personal household items. Clean all high touch surfaces. Monitor your symptoms, if you have a medical emergency call 911.            More than > 30 min spent with patient , note , nurse .

## 2020-04-08 NOTE — DISCHARGE NOTE PROVIDER - NSDCCPCAREPLAN_GEN_ALL_CORE_FT
PRINCIPAL DISCHARGE DIAGNOSIS  Diagnosis: Acute appendicitis  Assessment and Plan of Treatment: 1) Regular diet as tolerated  2) OK to shower in 24hours, no immersion in water for 2 weeks or until cleared by surgeon  3) Activity as tolerated EXCEPT no heavy lifting >10lbs for 2 weeks then none >25lbs for 6 weeks or until cleared by surgeon  4) Call clinic to make appointment in 4 weeks with Dr. Horowitz  5) DO NOT DRIVE while taking narcotic pain medication  6) Over the Counter Tylenol and Ibuprofen (with food) for mild to moderate pain, narcotic prescription for severe pain  7) Call clinic with concerns regarding persistent fevers, severe pain not relieved with medications, persistent nausea/vomiting, redness surrounding wound or purulent drainage from wound      SECONDARY DISCHARGE DIAGNOSES  Diagnosis: COVID-19 virus infection  Assessment and Plan of Treatment: PRINCIPAL DISCHARGE DIAGNOSIS  Diagnosis: Acute appendicitis  Assessment and Plan of Treatment: 1) Regular diet as tolerated  2) OK to shower in 24hours, no immersion in water for 2 weeks or until cleared by surgeon  3) Activity as tolerated EXCEPT no heavy lifting >10lbs for 2 weeks then none >25lbs for 6 weeks or until cleared by surgeon  4) Call clinic to make appointment in 4 weeks with Dr. Horowitz  5) DO NOT DRIVE while taking narcotic pain medication  6) Over the Counter Tylenol   7) Call clinic with concerns regarding persistent fevers, severe pain not relieved with medications, persistent nausea/vomiting, redness surrounding wound or purulent drainage from wound      SECONDARY DISCHARGE DIAGNOSES  Diagnosis: COVID-19 virus infection  Assessment and Plan of Treatment: / Viral Pneumonia - continue Plaquenic , continue supplements , if any sob / fever , talk to your PMD , if severe return to Hospital

## 2020-04-08 NOTE — PROGRESS NOTE ADULT - SUBJECTIVE AND OBJECTIVE BOX
Patient seen and examined at bedside  Reports feeling ok, denies N/V, chest pain, or diffuse abdominal pain  Denies cough or shortness of breath      MEDICATIONS  (STANDING):  acetaminophen   Tablet .. 650 milliGRAM(s) Oral every 6 hours  cefoTEtan  IVPB      cefoTEtan  IVPB 1 Gram(s) IV Intermittent every 12 hours  enoxaparin Injectable 40 milliGRAM(s) SubCutaneous every 24 hours  gabapentin 100 milliGRAM(s) Oral three times a day  lactated ringers. 1000 milliLiter(s) (125 mL/Hr) IV Continuous <Continuous>  pantoprazole    Tablet 40 milliGRAM(s) Oral before breakfast  senna 2 Tablet(s) Oral at bedtime    MEDICATIONS  (PRN):  ondansetron Injectable 4 milliGRAM(s) IV Push every 6 hours PRN Nausea  traMADol 25 milliGRAM(s) Oral every 4 hours PRN Severe Pain (7 - 10)      Vital Signs Last 24 Hrs  T(C): 36.9 (2020 04:14), Max: 38.6 (2020 23:17)  T(F): 98.4 (2020 04:14), Max: 101.4 (2020 23:17)  HR: 91 (2020 04:14) (65 - 126)  BP: 104/69 (2020 04:14) (94/57 - 123/66)  BP(mean): --  RR: 20 (2020 04:14) (16 - 20)  SpO2: 92% (2020 04:14) (92% - 100%)    PE  Gen: NAD  CV: RRR  Abd: soft, non distended; incisions C/D/I;   Ext: no edema or cyanosis  Neuro: GCS 15    I&O's Detail    2020 07:01  -  2020 04:34  --------------------------------------------------------  IN:  Total IN: 0 mL    OUT:    Voided: 1000 mL  Total OUT: 1000 mL    Total NET: -1000 mL    LABS:                        14.2   10.72 )-----------( 226      ( 2020 03:22 )             42.0     04-07    136  |  97<L>  |  7.0<L>  ----------------------------<  105<H>  4.2   |  23.0  |  0.77    Ca    9.1      2020 03:22    TPro  8.2  /  Alb  4.2  /  TBili  0.5  /  DBili  x   /  AST  26  /  ALT  21  /  AlkPhos  48  04-07    PT/INR - ( 2020 09:21 )   PT: 16.0 sec;   INR: 1.40 ratio         PTT - ( 2020 09:21 )  PTT:30.4 sec  Urinalysis Basic - ( 2020 11:27 )    Color: Yellow / Appearance: Clear / S.005 / pH: x  Gluc: x / Ketone: Small  / Bili: Negative / Urobili: Negative mg/dL   Blood: x / Protein: 30 mg/dL / Nitrite: Negative   Leuk Esterase: Negative / RBC: 3-5 /HPF / WBC 0-2   Sq Epi: x / Non Sq Epi: Occasional / Bacteria: Occasional

## 2020-04-08 NOTE — DISCHARGE NOTE PROVIDER - CARE PROVIDER_API CALL
Osman Horowitz (MD)  Surgery  [address], [address]  [place] [address]  Phone: [phone]  Fax: [phone]  Follow Up Time: 1 month Osman Horowitz)  Surgery  [address], [address]  [place] [address]  Phone: [phone]  Fax: [phone]  Follow Up Time: 1 month    PMD,   in 7- 10 days  Phone: (   )    -  Fax: (   )    -  Follow Up Time:

## 2020-04-08 NOTE — DISCHARGE NOTE PROVIDER - NSDCMRMEDTOKEN_GEN_ALL_CORE_FT
docusate sodium 100 mg oral capsule: 1 cap(s) orally 2 times a day, As needed, Stool Softening  ibuprofen 600 mg oral tablet: 1 tab(s) orally every 6 hours, As needed, Mild Pain (1 - 3) acetaminophen 325 mg oral tablet: 2 tab(s) orally every 6 hours, As needed, Temp greater or equal to 38.5C (101.3F), Mild Pain (1 - 3)  ascorbic acid 1000 mg oral tablet: 1 tab(s) orally once a day  cholecalciferol oral tablet: 1000 unit(s) orally once a day  hydroxychloroquine 200 mg oral tablet: 1  orally 2 times a day , 8 doses acetaminophen 325 mg oral tablet: 2 tab(s) orally every 6 hours, As needed, Temp greater or equal to 38.5C (101.3F), Mild Pain (1 - 3)  ascorbic acid 1000 mg oral tablet: 1 tab(s) orally once a day  cholecalciferol oral tablet: 1000 unit(s) orally once a day  hydroxychloroquine 200 mg oral tablet: 1  orally 2 times a day , 8 doses   Plaquenil 200 mg oral tablet: 1 tab(s) orally 2 times a day   thiamine 100 mg oral tablet: 2 tab(s) orally once a day   traMADol 50 mg oral tablet: 1 /2 to 1 tablet  orally Q 6 hrs PRN acetaminophen 325 mg oral tablet: 2 tab(s) orally every 6 hours, As needed, Temp greater or equal to 38.5C (101.3F), Mild Pain (1 - 3)  ascorbic acid 1000 mg oral tablet: 1 tab(s) orally once a day  cholecalciferol oral tablet: 1000 unit(s) orally once a day  hydroxychloroquine 200 mg oral tablet: 1  orally 2 times a day , 8 doses   Plaquenil 200 mg oral tablet: 1 tab(s) orally 2 times a day   thiamine 100 mg oral tablet: 2 tab(s) orally once a day   traMADol 50 mg oral tablet: 1/2 -1  tab(s) orally every 4 -6 hours PRN for moderate to severe pain MDD:4 tablets  traMADol 50 mg oral tablet: 1 /2 to 1 tablet  orally Q 6 hrs PRN

## 2020-04-08 NOTE — DISCHARGE NOTE PROVIDER - NSDCACTIVITY_GEN_ALL_CORE
No heavy lifting/straining/Walking - Indoors allowed/Walking - Outdoors allowed/Showering allowed/Do not drive or operate machinery/Do not make important decisions/Stairs allowed

## 2020-04-08 NOTE — PROGRESS NOTE ADULT - ASSESSMENT
35 y/o F COVID +, POD#1 s/p laparoscopic appendectomy for acute gangrenous appendicitis. Patient is clinically stable, currently afebrile. No immediate postop complications.   - continue ABX  - CBC  - hep lock   - OOB/Ambulate ad lucia  - incentive spirometry   - continue isolation and airborne precaution

## 2020-04-08 NOTE — PROGRESS NOTE ADULT - SUBJECTIVE AND OBJECTIVE BOX
Asked by surgical team to transfer patient under medical team .     Patient seen and examined . S/p  appendectomy . Non perforated gangrenous appendix . POD # 1 .   c/o mild abdominal pain , no n/v , no sob ,     CC : RUQ pain , nausea / vomiting - resolved , loss of taste and smell     HPI:  Patient is a 34y old  Female who presents with a chief complaint of RLQ pain     HPI: 34 year old female with PMH significant for PE in 2016 currently not on anticoagulation presenting to [facility] ED today with complaints of RLQ pain associated with nausea and emesis during the past 2 days. Patient states that the pain was first localized in the periumbilical region then migrated to the RLQ. Patient denies any fevers but does note Ageusia and anosmia for which she has been self-isolating at home concerned for COVID19 infection. Last menstrual cycle was 4/2. Last meal was yesterday. Patient denies fevers/chills, denies lightheadedness/dizziness, denies SOB/chest pain, denies constipation/diarrhea.      ROS: 10-system review is otherwise negative except HPI above.      PAST MEDICAL & SURGICAL HISTORY:  No pertinent past medical history  No significant past surgical history    FAMILY HISTORY:  No pertinent family history in first degree relatives    [] Family history not pertinent as reviewed with the patient and family    SOCIAL HISTORY:  denies toxic habits X3    ALLERGIES: No Known Allergies      HOME MEDICATIONS: Denies (2020 08:20)      PAST MEDICAL & SURGICAL HISTORY:  Pulmonary embolism: 2016  No pertinent past medical history  No significant past surgical history      MEDICATIONS  (STANDING):  cefoTEtan  IVPB      cefoTEtan  IVPB 1 Gram(s) IV Intermittent every 12 hours  enoxaparin Injectable 40 milliGRAM(s) SubCutaneous every 24 hours  gabapentin 100 milliGRAM(s) Oral three times a day  hydroxychloroquine 400 milliGRAM(s) Oral every 12 hours  hydroxychloroquine   Oral   lactated ringers. 1000 milliLiter(s) (75 mL/Hr) IV Continuous <Continuous>  pantoprazole    Tablet 40 milliGRAM(s) Oral before breakfast  potassium phosphate / sodium phosphate powder 1 Packet(s) Oral three times a day  senna 2 Tablet(s) Oral at bedtime    MEDICATIONS  (PRN):  acetaminophen   Tablet .. 650 milliGRAM(s) Oral every 6 hours PRN Temp greater or equal to 38.5C (101.3F), Mild Pain (1 - 3)  ALBUTerol    90 MICROgram(s) HFA Inhaler 2 Puff(s) Inhalation every 6 hours PRN Shortness of Breath and/or Wheezing  benzonatate 100 milliGRAM(s) Oral three times a day PRN Cough  ondansetron Injectable 4 milliGRAM(s) IV Push every 6 hours PRN Nausea  traMADol 25 milliGRAM(s) Oral every 4 hours PRN Severe Pain (7 - 10)      LABS:                          12.6   9.77  )-----------( 225      ( 2020 12:13 )             37.6     04-08    138  |  101  |  8.0  ----------------------------<  103<H>  3.7   |  23.0  |  0.55    Ca    8.9      2020 12:13  Phos  2.2     04-08  Mg     2.1     -08    TPro  8.2  /  Alb  4.2  /  TBili  0.5  /  DBili  x   /  AST  26  /  ALT  21  /  AlkPhos  48  04-07    PT/INR - ( 2020 09:21 )   PT: 16.0 sec;   INR: 1.40 ratio         PTT - ( 2020 09:21 )  PTT:30.4 sec    COVID-19 PCR . (20 @ 08:11)    COVID-19 PCR: NotDetec: This test has been validated by [employer] to be accurate;  though it has not been FDA cleared/approved by the usual pathway  As with all laboratory test, results should be correlated with clinical  findings.  https://www.fda.gov/media/888705/download  https://www.fda.gov/media/279632/download    COVID-19 PCR: Detected: TYPE:(C=Critical, N=Notification, A=Abnormal) c  TESTS: _covid  DATE/TIME CALLED: _20 17:43  CALLED TO: yaneth koo  READ BACK (2 Patient Identifiers)(Y/N): _y  READ BACK VALUES (Y/N): _y  CALLED BY: alexander manzo  This test has been validatedby [employer] to be accurate;  though it has not been FDA cleared/approved by the usual pathway  As with all laboratory test, results should be correlated with clinical  findings.  https://www.fda.gov/media/039821/download  https://www.fda.gov/media/977299/download (20 @ 16:36)      Urinalysis Basic - ( 2020 11:27 )    Color: Yellow / Appearance: Clear / S.005 / pH: x  Gluc: x / Ketone: Small  / Bili: Negative / Urobili: Negative mg/dL   Blood: x / Protein: 30 mg/dL / Nitrite: Negative   Leuk Esterase: Negative / RBC: 3-5 /HPF / WBC 0-2   Sq Epi: x / Non Sq Epi: Occasional / Bacteria: Occasional    RADIOLOGY & ADDITIONAL TESTS:  < from: Xray Chest 1 View- PORTABLE-Urgent (20 @ 17:02) >     EXAM:  XR CHEST PORTABLE URGENT 1V                          PROCEDURE DATE:  2020          INTERPRETATION:  History: Abnormal lung findings on CT abdomen    Technique: Single frontal portable view of the chest with comparison to  3/22/2016    Findings:    The heart is normal in size. Right lower lobe opacity new from prior exam suggesting pneumonia corresponding to findings on CT. Additional likely subtle patchy opacities in the left mid to lower lung.. The apices and hemidiaphragms are unremarkable. Visualized osseous structures are within normal limits.        Impression:    Right lower lobe opacity with likely additional patchy opacities in left mid to lower lung corresponding to findings on CT of the abdomen compatible with multifocal pneumonia        JORDEN CRAIG   This document has been electronically signed. 2020  5:12PM    < end of copied text >    < from: CT Abdomen and Pelvis w/ IV Cont (20 @ 05:58) >   EXAM:  CT ABDOMEN AND PELVIS IC                          PROCEDURE DATE:  2020          INTERPRETATION:  CLINICAL INFORMATION: Right-sided abdominal pain    TECHNIQUE: CT imaging of the abdomen and pelvis was performed with IV and oral contrast. 90 mL of Omnipaque 350 was injected intravenously. 10 mL was discarded.    COMPARISON: 10/24/2010    FINDINGS:    Patchy groundglass and confluent airspace opacities at the lung bases which may be due to atypical/viral pneumonia.    The liver, spleen, pancreas, gallbladder and biliary tree are unremarkable.    The kidneys enhance symmetrically without hydronephrosis. The adrenal glands are unremarkable.    There is no bowel obstruction or ascites. The appendix is dilated, measuring up to 1.6 cm with wall thickening and surrounding inflammatory change consistent with acute appendicitis.    The urinary bladder is unremarkable. There is no pelvic mass. An IUD is noted.    There is no abdominal or pelvic lymphadenopathy.    The aorta and IVC are unremarkable.    The visualized osseous structures are within normal limits.      IMPRESSION:    Acute appendicitis. No drainable collection.    Patchy groundglass and confluent airspace opacities at the lung bases which may be due to atypical/viral pneumonia.    The findings were discussed with JOSÉ LUIS VEGA on 2020 6:12 AM.  Hospital policies for call back including read back policies were followed. The verbal communication call back supplements this written report.      DEMARCO MOSCOSO M.D., ATTENDING RADIOLOGIST  This document has been electronically signed. 2020  6:13AM        < end of copied text >    < from: 12 Lead ECG (20 @ 19:29) >    Ventricular Rate 111 BPM    Atrial Rate 111 BPM    P-R Interval 150 ms    QRS Duration 82 ms    Q-T Interval 326 ms    QTC Calculation(Bezet) 443 ms    P Axis 33 degrees    R Axis 64 degrees    T Axis 0 degrees    Diagnosis Line Sinus tachycardia    Confirmed by CAREY MAY ([id number]) on 2020 9:28:40 AM    < end of copied text >            REVIEW OF SYSTEMS:    As above , all other systems are reviewed and are negative    Vital Signs Last 24 Hrs  T(C): 37 (2020 11:22), Max: 38.6 (2020 23:17)  T(F): 98.6 (2020 11:22), Max: 101.4 (2020 23:17)  HR: 74 (2020 11:22) (65 - 126)  BP: 93/63 (2020 11:22) (93/63 - 107/70)  BP(mean): --  RR: 18 (2020 11:22) (16 - 20)  SpO2: 95% (2020 11:22) (92% - 98%)    PHYSICAL EXAM:    GENERAL: NAD, well-groomed, well-developed  HEAD:  Atraumatic, Normocephalic  EYES: EOMI, PERRLA, conjunctiva and sclera clear  NECK: Supple, No JVD, Normal thyroid  NERVOUS SYSTEM:  Alert & Oriented X3, no focal deficit  CHEST/LUNG: CTA b/l ,  no  rales, rhonchi, wheezing, or rubs  HEART: Regular rate and rhythm; No murmurs, rubs, or gallops  ABDOMEN: Soft, Nontender, Nondistended; Bowel sounds present , small dressings + on R sided of abdomen   EXTREMITIES:  2+ Peripheral Pulses, No clubbing, cyanosis, or edema  LYMPH: No lymphadenopathy noted  SKIN: No rashes or lesions

## 2020-04-08 NOTE — PROGRESS NOTE ADULT - ASSESSMENT
33 y/o F COVID +, POD#1 s/p laparoscopic appendectomy for acute gangrenous appendicitis. Patient is clinically stable, currently afebrile. No n/v , no sob , no cough , loss of taste / smell   - continue ABX  - CBC  - hep lock   - OOB/Ambulate ad lucia  - incentive spirometry   - continue isolation and airborne precaution        Problem/Plan - 1:  ·  Problem: Acute appendicitis with localized peritonitis and gangrene, without perforation or abscess.   S/P  uncomplicated Laparoscopic Appendectomy 4/7/2020 which revealed gangrenous nonperforated nonsuppurative appendicitis.   Plan: Continue with PO as tolerated.  Complete post op antibiotics   Pain control as needed.   - continue ABX  - OOB/Ambulate ad lucia  - incentive spirometry   - continue isolation and airborne precaution      Problem/Plan - 2:  ·  Problem: COVID-19 virus infection , viral multilobar PNA .  Initial test negative , repeated test ( 4/7/20 ) - positive .   CT AP imaging including inferior chest and was notable for Ground glass opacities of the bilateral lower lobes consistent with COVID infection.   Patient without c/o sob , cough , afebrile , saturating 92% - 95 % on RA while in the bed , will check oxygenation while   ambulating . Started on Vit C/ D/ Thiamine , started on Plaquenil .     Follow up labs in am , dispo Home - likely in am .

## 2020-04-08 NOTE — DISCHARGE NOTE PROVIDER - PROVIDER TOKENS
PROVIDER:[TOKEN:[[id number]],FOLLOWUP:[1 month]] PROVIDER:[TOKEN:[[id number]],FOLLOWUP:[1 month]],FREE:[LAST:[PMD],PHONE:[(   )    -],FAX:[(   )    -],ADDRESS:[in 7- 10 days]]

## 2020-04-08 NOTE — DISCHARGE NOTE PROVIDER - NSDCCPTREATMENT_GEN_ALL_CORE_FT
PRINCIPAL PROCEDURE  Procedure: Lap appendectomy  Findings and Treatment: 4/7/2020 - Uncomplicated procedure.  Gangrenous nonperforated, nonsupperative appendicitis.

## 2020-04-09 ENCOUNTER — TRANSCRIPTION ENCOUNTER (OUTPATIENT)
Age: 35
End: 2020-04-09

## 2020-04-09 VITALS
SYSTOLIC BLOOD PRESSURE: 101 MMHG | RESPIRATION RATE: 19 BRPM | DIASTOLIC BLOOD PRESSURE: 66 MMHG | HEART RATE: 67 BPM | TEMPERATURE: 99 F | OXYGEN SATURATION: 93 %

## 2020-04-09 LAB
ALBUMIN SERPL ELPH-MCNC: 3.3 G/DL — SIGNIFICANT CHANGE UP (ref 3.3–5.2)
ALP SERPL-CCNC: 41 U/L — SIGNIFICANT CHANGE UP (ref 40–120)
ALT FLD-CCNC: 15 U/L — SIGNIFICANT CHANGE UP
ANION GAP SERPL CALC-SCNC: 11 MMOL/L — SIGNIFICANT CHANGE UP (ref 5–17)
AST SERPL-CCNC: 15 U/L — SIGNIFICANT CHANGE UP
BASOPHILS # BLD AUTO: 0.01 K/UL — SIGNIFICANT CHANGE UP (ref 0–0.2)
BASOPHILS NFR BLD AUTO: 0.2 % — SIGNIFICANT CHANGE UP (ref 0–2)
BILIRUB SERPL-MCNC: 0.3 MG/DL — LOW (ref 0.4–2)
BUN SERPL-MCNC: 6 MG/DL — LOW (ref 8–20)
CALCIUM SERPL-MCNC: 8.6 MG/DL — SIGNIFICANT CHANGE UP (ref 8.6–10.2)
CHLORIDE SERPL-SCNC: 105 MMOL/L — SIGNIFICANT CHANGE UP (ref 98–107)
CO2 SERPL-SCNC: 24 MMOL/L — SIGNIFICANT CHANGE UP (ref 22–29)
CREAT SERPL-MCNC: 0.49 MG/DL — LOW (ref 0.5–1.3)
CRP SERPL-MCNC: 11.67 MG/DL — HIGH (ref 0–0.4)
D DIMER BLD IA.RAPID-MCNC: 427 NG/ML DDU — HIGH
EOSINOPHIL # BLD AUTO: 0 K/UL — SIGNIFICANT CHANGE UP (ref 0–0.5)
EOSINOPHIL NFR BLD AUTO: 0 % — SIGNIFICANT CHANGE UP (ref 0–6)
FERRITIN SERPL-MCNC: 263 NG/ML — HIGH (ref 15–150)
GLUCOSE SERPL-MCNC: 86 MG/DL — SIGNIFICANT CHANGE UP (ref 70–99)
HCT VFR BLD CALC: 34.7 % — SIGNIFICANT CHANGE UP (ref 34.5–45)
HGB BLD-MCNC: 11.5 G/DL — SIGNIFICANT CHANGE UP (ref 11.5–15.5)
IMM GRANULOCYTES NFR BLD AUTO: 0.6 % — SIGNIFICANT CHANGE UP (ref 0–1.5)
LDH SERPL L TO P-CCNC: 146 U/L — SIGNIFICANT CHANGE UP (ref 98–192)
LYMPHOCYTES # BLD AUTO: 2.02 K/UL — SIGNIFICANT CHANGE UP (ref 1–3.3)
LYMPHOCYTES # BLD AUTO: 40.8 % — SIGNIFICANT CHANGE UP (ref 13–44)
MAGNESIUM SERPL-MCNC: 2 MG/DL — SIGNIFICANT CHANGE UP (ref 1.8–2.6)
MCHC RBC-ENTMCNC: 30.2 PG — SIGNIFICANT CHANGE UP (ref 27–34)
MCHC RBC-ENTMCNC: 33.1 GM/DL — SIGNIFICANT CHANGE UP (ref 32–36)
MCV RBC AUTO: 91.1 FL — SIGNIFICANT CHANGE UP (ref 80–100)
MONOCYTES # BLD AUTO: 0.38 K/UL — SIGNIFICANT CHANGE UP (ref 0–0.9)
MONOCYTES NFR BLD AUTO: 7.7 % — SIGNIFICANT CHANGE UP (ref 2–14)
NEUTROPHILS # BLD AUTO: 2.51 K/UL — SIGNIFICANT CHANGE UP (ref 1.8–7.4)
NEUTROPHILS NFR BLD AUTO: 50.7 % — SIGNIFICANT CHANGE UP (ref 43–77)
PHOSPHATE SERPL-MCNC: 2.3 MG/DL — LOW (ref 2.4–4.7)
PLATELET # BLD AUTO: 247 K/UL — SIGNIFICANT CHANGE UP (ref 150–400)
POTASSIUM SERPL-MCNC: 3.9 MMOL/L — SIGNIFICANT CHANGE UP (ref 3.5–5.3)
POTASSIUM SERPL-SCNC: 3.9 MMOL/L — SIGNIFICANT CHANGE UP (ref 3.5–5.3)
PROCALCITONIN SERPL-MCNC: 0.14 NG/ML — HIGH (ref 0.02–0.1)
PROT SERPL-MCNC: 6.3 G/DL — LOW (ref 6.6–8.7)
RBC # BLD: 3.81 M/UL — SIGNIFICANT CHANGE UP (ref 3.8–5.2)
RBC # FLD: 13.2 % — SIGNIFICANT CHANGE UP (ref 10.3–14.5)
SODIUM SERPL-SCNC: 140 MMOL/L — SIGNIFICANT CHANGE UP (ref 135–145)
WBC # BLD: 4.95 K/UL — SIGNIFICANT CHANGE UP (ref 3.8–10.5)
WBC # FLD AUTO: 4.95 K/UL — SIGNIFICANT CHANGE UP (ref 3.8–10.5)

## 2020-04-09 PROCEDURE — 87040 BLOOD CULTURE FOR BACTERIA: CPT

## 2020-04-09 PROCEDURE — 71045 X-RAY EXAM CHEST 1 VIEW: CPT

## 2020-04-09 PROCEDURE — 82728 ASSAY OF FERRITIN: CPT

## 2020-04-09 PROCEDURE — 93005 ELECTROCARDIOGRAM TRACING: CPT

## 2020-04-09 PROCEDURE — 86900 BLOOD TYPING SEROLOGIC ABO: CPT

## 2020-04-09 PROCEDURE — 74177 CT ABD & PELVIS W/CONTRAST: CPT

## 2020-04-09 PROCEDURE — 84145 PROCALCITONIN (PCT): CPT

## 2020-04-09 PROCEDURE — 83735 ASSAY OF MAGNESIUM: CPT

## 2020-04-09 PROCEDURE — 86140 C-REACTIVE PROTEIN: CPT

## 2020-04-09 PROCEDURE — 83615 LACTATE (LD) (LDH) ENZYME: CPT

## 2020-04-09 PROCEDURE — 85730 THROMBOPLASTIN TIME PARTIAL: CPT

## 2020-04-09 PROCEDURE — 81025 URINE PREGNANCY TEST: CPT

## 2020-04-09 PROCEDURE — 80053 COMPREHEN METABOLIC PANEL: CPT

## 2020-04-09 PROCEDURE — 96374 THER/PROPH/DIAG INJ IV PUSH: CPT

## 2020-04-09 PROCEDURE — 88304 TISSUE EXAM BY PATHOLOGIST: CPT

## 2020-04-09 PROCEDURE — 93010 ELECTROCARDIOGRAM REPORT: CPT

## 2020-04-09 PROCEDURE — 36415 COLL VENOUS BLD VENIPUNCTURE: CPT

## 2020-04-09 PROCEDURE — 81001 URINALYSIS AUTO W/SCOPE: CPT

## 2020-04-09 PROCEDURE — 83690 ASSAY OF LIPASE: CPT

## 2020-04-09 PROCEDURE — 87635 SARS-COV-2 COVID-19 AMP PRB: CPT

## 2020-04-09 PROCEDURE — 86850 RBC ANTIBODY SCREEN: CPT

## 2020-04-09 PROCEDURE — 85610 PROTHROMBIN TIME: CPT

## 2020-04-09 PROCEDURE — 86901 BLOOD TYPING SEROLOGIC RH(D): CPT

## 2020-04-09 PROCEDURE — 84100 ASSAY OF PHOSPHORUS: CPT

## 2020-04-09 PROCEDURE — 85379 FIBRIN DEGRADATION QUANT: CPT

## 2020-04-09 PROCEDURE — 99239 HOSP IP/OBS DSCHRG MGMT >30: CPT

## 2020-04-09 PROCEDURE — 80048 BASIC METABOLIC PNL TOTAL CA: CPT

## 2020-04-09 PROCEDURE — 87086 URINE CULTURE/COLONY COUNT: CPT

## 2020-04-09 PROCEDURE — T1013: CPT

## 2020-04-09 PROCEDURE — 99024 POSTOP FOLLOW-UP VISIT: CPT

## 2020-04-09 PROCEDURE — 85027 COMPLETE CBC AUTOMATED: CPT

## 2020-04-09 PROCEDURE — 84702 CHORIONIC GONADOTROPIN TEST: CPT

## 2020-04-09 PROCEDURE — 99285 EMERGENCY DEPT VISIT HI MDM: CPT | Mod: 25

## 2020-04-09 RX ORDER — CHOLECALCIFEROL (VITAMIN D3) 125 MCG
1000 CAPSULE ORAL
Qty: 10 | Refills: 0
Start: 2020-04-09 | End: 2020-04-18

## 2020-04-09 RX ORDER — ASCORBIC ACID 60 MG
1 TABLET,CHEWABLE ORAL
Qty: 7 | Refills: 0
Start: 2020-04-09 | End: 2020-04-15

## 2020-04-09 RX ORDER — THIAMINE MONONITRATE (VIT B1) 100 MG
1 TABLET ORAL
Qty: 0 | Refills: 0 | DISCHARGE
Start: 2020-04-09

## 2020-04-09 RX ORDER — TRAMADOL HYDROCHLORIDE 50 MG/1
1 TABLET ORAL
Qty: 30 | Refills: 0
Start: 2020-04-09 | End: 2020-04-13

## 2020-04-09 RX ORDER — ACETAMINOPHEN 500 MG
2 TABLET ORAL
Qty: 0 | Refills: 0 | DISCHARGE
Start: 2020-04-09

## 2020-04-09 RX ORDER — HYDROXYCHLOROQUINE SULFATE 200 MG
1 TABLET ORAL
Qty: 8 | Refills: 0
Start: 2020-04-09 | End: 2020-04-12

## 2020-04-09 RX ORDER — ASCORBIC ACID 60 MG
1 TABLET,CHEWABLE ORAL
Qty: 0 | Refills: 0 | DISCHARGE
Start: 2020-04-09

## 2020-04-09 RX ORDER — TRAMADOL HYDROCHLORIDE 50 MG/1
1 TABLET ORAL
Qty: 0 | Refills: 0 | DISCHARGE
Start: 2020-04-09

## 2020-04-09 RX ORDER — CHOLECALCIFEROL (VITAMIN D3) 125 MCG
1000 CAPSULE ORAL
Qty: 0 | Refills: 0 | DISCHARGE
Start: 2020-04-09

## 2020-04-09 RX ORDER — HYDROXYCHLOROQUINE SULFATE 200 MG
1 TABLET ORAL
Qty: 0 | Refills: 0 | DISCHARGE
Start: 2020-04-09

## 2020-04-09 RX ORDER — THIAMINE MONONITRATE (VIT B1) 100 MG
2 TABLET ORAL
Qty: 14 | Refills: 0
Start: 2020-04-09 | End: 2020-04-15

## 2020-04-09 RX ADMIN — PANTOPRAZOLE SODIUM 40 MILLIGRAM(S): 20 TABLET, DELAYED RELEASE ORAL at 05:10

## 2020-04-09 RX ADMIN — GABAPENTIN 100 MILLIGRAM(S): 400 CAPSULE ORAL at 05:10

## 2020-04-09 RX ADMIN — TRAMADOL HYDROCHLORIDE 25 MILLIGRAM(S): 50 TABLET ORAL at 02:00

## 2020-04-09 RX ADMIN — Medication 1 PACKET(S): at 05:10

## 2020-04-09 RX ADMIN — SODIUM CHLORIDE 75 MILLILITER(S): 9 INJECTION, SOLUTION INTRAVENOUS at 05:10

## 2020-04-09 RX ADMIN — Medication 100 GRAM(S): at 05:09

## 2020-04-09 RX ADMIN — TRAMADOL HYDROCHLORIDE 25 MILLIGRAM(S): 50 TABLET ORAL at 06:08

## 2020-04-09 NOTE — DISCHARGE NOTE NURSING/CASE MANAGEMENT/SOCIAL WORK - NSDCVIVACCINE_GEN_ALL_CORE_FT
Influenza , 2016/3/27 13:46 , Masha Quezada (RN)  Influenza , 2018/11/10 06:12 , Tatiana Bright (RN)  Tdap , 2018/11/10 15:39 , Edwige Berry (RN)

## 2020-04-09 NOTE — PROGRESS NOTE ADULT - ASSESSMENT
33 y/o F COVID +, POD#2 s/p laparoscopic appendectomy for acute gangrenous appendicitis. Patient is clinically stable, currently afebrile. No immediate postop complications.   - continue ABX to complete 4 days total  - cont reg diet  - OOB/Ambulate ad lucia  - incentive spirometry   - continue isolation and airborne precaution and COVID management per medicine team 35 y/o F COVID +, POD#2 s/p laparoscopic appendectomy for acute gangrenous appendicitis. Patient is clinically stable, currently afebrile. No immediate postop complications.   - continue ABX to complete 4 days total (ok to DC on PO Ciprofloxacin to end 4/11/2020  - cont reg diet  - OOB/Ambulate ad lucia  - incentive spirometry   - continue isolation and airborne precaution and COVID management per medicine team     OK to discharge to home from surgical perspective, followup Dr. Horowitz one month.  If urgent concerns arise, return to ED    No further surgical intervention, management indicated

## 2020-04-09 NOTE — PROGRESS NOTE ADULT - ATTENDING COMMENTS
Patient seen and examined with surgery team. Above note reviewed and edited where appropriate.     Postop lap appy, pain controlled, tolerating diet  Clear for discharge home if COVID/respiratory status safe for dc
Agree with above assessment.  The patient was seen and examined by me.  The patient overall is comfortable with no nausea, no vomit, and no abdominal pain.  The patient is without fever now but febrile overnight.  Abdomen is soft and non distended with no guarding or rebound.  Incision sites are clean and dry.  Will continue with post op antibiotics for the gangrenous appendix, and transfer to medical service for management of the COVID.

## 2020-04-09 NOTE — PROGRESS NOTE ADULT - SUBJECTIVE AND OBJECTIVE BOX
SUBJECTIVE:  Patient seen and examined at bedside  Reports feeling well. Tolerating diet without N/V  Reports no chest pain, sob, or fever    MEDICATIONS  (STANDING):  ascorbic acid 1000 milliGRAM(s) Oral daily  cefoTEtan  IVPB      cefoTEtan  IVPB 1 Gram(s) IV Intermittent every 12 hours  cholecalciferol 1000 Unit(s) Oral daily  enoxaparin Injectable 40 milliGRAM(s) SubCutaneous every 24 hours  gabapentin 100 milliGRAM(s) Oral three times a day  hydroxychloroquine   Oral   hydroxychloroquine 200 milliGRAM(s) Oral every 12 hours  lactated ringers. 1000 milliLiter(s) (75 mL/Hr) IV Continuous <Continuous>  pantoprazole    Tablet 40 milliGRAM(s) Oral before breakfast  potassium phosphate / sodium phosphate powder 1 Packet(s) Oral three times a day  senna 2 Tablet(s) Oral at bedtime  thiamine 200 milliGRAM(s) Oral daily    MEDICATIONS  (PRN):  acetaminophen   Tablet .. 650 milliGRAM(s) Oral every 6 hours PRN Temp greater or equal to 38.5C (101.3F), Mild Pain (1 - 3)  ALBUTerol    90 MICROgram(s) HFA Inhaler 2 Puff(s) Inhalation every 6 hours PRN Shortness of Breath and/or Wheezing  benzonatate 100 milliGRAM(s) Oral three times a day PRN Cough  ondansetron Injectable 4 milliGRAM(s) IV Push every 6 hours PRN Nausea  traMADol 25 milliGRAM(s) Oral every 4 hours PRN Severe Pain (7 - 10)      Vital Signs Last 24 Hrs  T(C): 37.3 (2020 23:52), Max: 37.3 (2020 23:52)  T(F): 99.1 (2020 23:52), Max: 99.1 (2020 23:52)  HR: 77 (2020 23:52) (74 - 91)  BP: 124/78 (2020 23:52) (93/63 - 124/78)  BP(mean): --  RR: 20 (2020 23:52) (18 - 20)  SpO2: 91% (2020 23:52) (91% - 95%)    PE  Gen: NAD  CV: RRR  Abd: soft, non distended; incisions C/D/I  Ext: no edema or cyanosis  Neuro: GCS15    I&O's Detail    2020 07:01  -  2020 07:00  --------------------------------------------------------  IN:  Total IN: 0 mL    OUT:    Voided: 1000 mL  Total OUT: 1000 mL    Total NET: -1000 mL    LABS:                        12.6   9.77  )-----------( 225      ( 2020 12:13 )             37.6     04-08    138  |  101  |  8.0  ----------------------------<  103<H>  3.7   |  23.0  |  0.55    Ca    8.9      2020 12:13  Phos  2.2     04-08  Mg     2.1     04-08    TPro  8.2  /  Alb  4.2  /  TBili  0.5  /  DBili  x   /  AST  26  /  ALT  21  /  AlkPhos  48  04-07    PT/INR - ( 2020 09:21 )   PT: 16.0 sec;   INR: 1.40 ratio         PTT - ( 2020 09:21 )  PTT:30.4 sec  Urinalysis Basic - ( 2020 11:27 )    Color: Yellow / Appearance: Clear / S.005 / pH: x  Gluc: x / Ketone: Small  / Bili: Negative / Urobili: Negative mg/dL   Blood: x / Protein: 30 mg/dL / Nitrite: Negative   Leuk Esterase: Negative / RBC: 3-5 /HPF / WBC 0-2   Sq Epi: x / Non Sq Epi: Occasional / Bacteria: Occasional

## 2020-04-09 NOTE — DISCHARGE NOTE NURSING/CASE MANAGEMENT/SOCIAL WORK - PATIENT PORTAL LINK FT
You can access the FollowMyHealth Patient Portal offered by [facility] by registering at the following website: [email or web address]. By joining [employer]’s FollowMyHealth portal, you will also be able to view your health information using other applications (apps) compatible with our system.

## 2020-04-13 LAB
CULTURE RESULTS: SIGNIFICANT CHANGE UP
SPECIMEN SOURCE: SIGNIFICANT CHANGE UP
SURGICAL PATHOLOGY STUDY: SIGNIFICANT CHANGE UP

## 2021-05-26 NOTE — PATIENT PROFILE OB - POST PARTUM DEPRESSION SCREEN OB 4
Medication refill:    Medication Name: allopurinol & atrovastatin    Medication last refilled: 07/02/2020    Provider: Chanda Anguiano NP    Pharmacy: [facility]    Last office visit: 12/28/2020     Follow up: Return in about 1 year (around 12/28/2021) for Medicare Wellness Visit, med check in 6 months with fasting labs prior.    Last lab related to medication: 06/19/2020    Upcoming appointments: 06/28/2021    Refill outcome: Filled per protocol         no

## 2025-03-30 ENCOUNTER — EMERGENCY (EMERGENCY)
Facility: HOSPITAL | Age: 40
LOS: 1 days | Discharge: HOME | End: 2025-03-30
Attending: EMERGENCY MEDICINE
Payer: COMMERCIAL

## 2025-03-30 VITALS
TEMPERATURE: 98 F | DIASTOLIC BLOOD PRESSURE: 97 MMHG | HEART RATE: 85 BPM | OXYGEN SATURATION: 99 % | WEIGHT: 187.61 LBS | RESPIRATION RATE: 18 BRPM | SYSTOLIC BLOOD PRESSURE: 153 MMHG

## 2025-03-30 LAB
ALBUMIN SERPL ELPH-MCNC: 4.3 G/DL — SIGNIFICANT CHANGE UP (ref 3.3–5.2)
ALP SERPL-CCNC: 80 U/L — SIGNIFICANT CHANGE UP (ref 40–120)
ALT FLD-CCNC: 30 U/L — SIGNIFICANT CHANGE UP
ANION GAP SERPL CALC-SCNC: 13 MMOL/L — SIGNIFICANT CHANGE UP (ref 5–17)
AST SERPL-CCNC: 25 U/L — SIGNIFICANT CHANGE UP
BASOPHILS # BLD AUTO: 0.02 K/UL — SIGNIFICANT CHANGE UP (ref 0–0.2)
BASOPHILS NFR BLD AUTO: 0.2 % — SIGNIFICANT CHANGE UP (ref 0–2)
BILIRUB SERPL-MCNC: <0.2 MG/DL — LOW (ref 0.4–2)
BUN SERPL-MCNC: 13.7 MG/DL — SIGNIFICANT CHANGE UP (ref 8–20)
CALCIUM SERPL-MCNC: 9.4 MG/DL — SIGNIFICANT CHANGE UP (ref 8.4–10.5)
CHLORIDE SERPL-SCNC: 103 MMOL/L — SIGNIFICANT CHANGE UP (ref 96–108)
CO2 SERPL-SCNC: 25 MMOL/L — SIGNIFICANT CHANGE UP (ref 22–29)
CREAT SERPL-MCNC: 0.64 MG/DL — SIGNIFICANT CHANGE UP (ref 0.5–1.3)
D DIMER BLD IA.RAPID-MCNC: <150 NG/ML DDU — SIGNIFICANT CHANGE UP
EGFR: 116 ML/MIN/1.73M2 — SIGNIFICANT CHANGE UP
EGFR: 116 ML/MIN/1.73M2 — SIGNIFICANT CHANGE UP
EOSINOPHIL # BLD AUTO: 0.05 K/UL — SIGNIFICANT CHANGE UP (ref 0–0.5)
EOSINOPHIL NFR BLD AUTO: 0.6 % — SIGNIFICANT CHANGE UP (ref 0–6)
GLUCOSE SERPL-MCNC: 104 MG/DL — HIGH (ref 70–99)
HCT VFR BLD CALC: 40 % — SIGNIFICANT CHANGE UP (ref 34.5–45)
HGB BLD-MCNC: 13.4 G/DL — SIGNIFICANT CHANGE UP (ref 11.5–15.5)
HIV 1 & 2 AB SERPL IA.RAPID: SIGNIFICANT CHANGE UP
IMM GRANULOCYTES # BLD AUTO: 0.02 K/UL — SIGNIFICANT CHANGE UP (ref 0–0.07)
IMM GRANULOCYTES NFR BLD AUTO: 0.2 % — SIGNIFICANT CHANGE UP (ref 0–0.9)
LYMPHOCYTES # BLD AUTO: 3.03 K/UL — SIGNIFICANT CHANGE UP (ref 1–3.3)
LYMPHOCYTES NFR BLD AUTO: 34.8 % — SIGNIFICANT CHANGE UP (ref 13–44)
MCHC RBC-ENTMCNC: 30.5 PG — SIGNIFICANT CHANGE UP (ref 27–34)
MCHC RBC-ENTMCNC: 33.5 G/DL — SIGNIFICANT CHANGE UP (ref 32–36)
MCV RBC AUTO: 91.1 FL — SIGNIFICANT CHANGE UP (ref 80–100)
MONOCYTES # BLD AUTO: 0.6 K/UL — SIGNIFICANT CHANGE UP (ref 0–0.9)
MONOCYTES NFR BLD AUTO: 6.9 % — SIGNIFICANT CHANGE UP (ref 2–14)
NEUTROPHILS # BLD AUTO: 4.99 K/UL — SIGNIFICANT CHANGE UP (ref 1.8–7.4)
NEUTROPHILS NFR BLD AUTO: 57.3 % — SIGNIFICANT CHANGE UP (ref 43–77)
NRBC # BLD AUTO: 0 K/UL — SIGNIFICANT CHANGE UP (ref 0–0)
NRBC # FLD: 0 K/UL — SIGNIFICANT CHANGE UP (ref 0–0)
NRBC BLD AUTO-RTO: 0 /100 WBCS — SIGNIFICANT CHANGE UP (ref 0–0)
PLATELET # BLD AUTO: 348 K/UL — SIGNIFICANT CHANGE UP (ref 150–400)
PMV BLD: 8.9 FL — SIGNIFICANT CHANGE UP (ref 7–13)
POTASSIUM SERPL-MCNC: 4 MMOL/L — SIGNIFICANT CHANGE UP (ref 3.5–5.3)
POTASSIUM SERPL-SCNC: 4 MMOL/L — SIGNIFICANT CHANGE UP (ref 3.5–5.3)
PROT SERPL-MCNC: 7.3 G/DL — SIGNIFICANT CHANGE UP (ref 6.6–8.7)
RBC # BLD: 4.39 M/UL — SIGNIFICANT CHANGE UP (ref 3.8–5.2)
RBC # FLD: 13.7 % — SIGNIFICANT CHANGE UP (ref 10.3–14.5)
SODIUM SERPL-SCNC: 141 MMOL/L — SIGNIFICANT CHANGE UP (ref 135–145)
TROPONIN T, HIGH SENSITIVITY RESULT: <6 NG/L — SIGNIFICANT CHANGE UP (ref 0–51)
WBC # BLD: 8.71 K/UL — SIGNIFICANT CHANGE UP (ref 3.8–10.5)
WBC # FLD AUTO: 8.71 K/UL — SIGNIFICANT CHANGE UP (ref 3.8–10.5)

## 2025-03-30 PROCEDURE — 71046 X-RAY EXAM CHEST 2 VIEWS: CPT | Mod: 26

## 2025-03-30 PROCEDURE — 99284 EMERGENCY DEPT VISIT MOD MDM: CPT

## 2025-03-30 RX ADMIN — Medication 1000 MILLILITER(S): at 22:56

## 2025-03-30 NOTE — ED PROVIDER NOTE - WORK/EXCUSE FORM DATE
02-Apr-2025
Patient to call [facility] to schedule appointment with Dr. Astorga and Gabriel Gaines.

## 2025-03-30 NOTE — ED PROVIDER NOTE - PROGRESS NOTE DETAILS
All results discussed with the patient, and a copy of results has been provided. Patient is comfortable with dc plan for home. Opportunity for questions was provided and all questions have been addressed. Patient understands when to return to ED if symptoms worsen.  updated all results with  kika

## 2025-03-30 NOTE — ED PROVIDER NOTE - PATIENT PORTAL LINK FT
You can access the FollowMyHealth Patient Portal offered by [facility] by registering at the following website: [email or web address]. By joining [employer]’s FollowMyHealth portal, you will also be able to view your health information using other applications (apps) compatible with our system.

## 2025-03-30 NOTE — ED ADULT NURSE NOTE - OBJECTIVE STATEMENT
Pt is a 40yo female who presents to the ER with complaints of flu like symptoms. Pt states she has been experiencing anxiety, stress, body aches, sore throat and dizziness x5 days. pt denies any fevers/chills. denies taking any medications for symptoms. Upon assessment, PT is alert and oriented, with a patent and self maintained airway, with non labored breathing. PT denies CP, SOB, HA, N/V/D, Fevers or chills.

## 2025-03-30 NOTE — ED PROVIDER NOTE - ATTENDING APP SHARED VISIT CONTRIBUTION OF CARE
39 year old female with no med hx presented to ED c/o sob, weakness, tiredness x 5 days  nsr no acute ischemia    labs non acitobale feeling imporved stable for dc

## 2025-03-30 NOTE — ED ADULT TRIAGE NOTE - CHIEF COMPLAINT QUOTE
pt c/o body aches, sore throat and dizziness x5 days. pt denies any fevers/chills. denies taking any medications for symptoms.

## 2025-03-30 NOTE — ED PROVIDER NOTE - OBJECTIVE STATEMENT
39 year old female with no med hx presented to ED c/o sob, weakness, tiredness x 5 days. States she has been having chest pain and feeling as if she cannot get a full breath after exertion over the last couple days. also w/ decreased appetite. She does admit to being more stressed than usual due to having a cervical biopsy that is pending results. states she is "in her head" and thinking "scary thoughts". also states her  passed away 2 years ago from cancer and hopes she does not have the same. she denies abd pain, fever/chills, headache. not on ocps, no cardiac hx, no recent long sedentary periods/flights, no calf pain    kika

## 2025-03-30 NOTE — ED PROVIDER NOTE - NSFOLLOWUPINSTRUCTIONS_ED_ALL_ED_FT
Acuda a shahab ryan de seguimiento con frias médico de cabecera en 1 o 2 días.    Consulte con frias ginecólogo/a según lo planeado.    Regrese si presenta síntomas nuevos o que empeoran.    Dificultad para respirar    La dificultad para respirar (disnea) significa que tiene dificultad para respirar y podría indicar un problema médico. Las causas incluyen enfermedades pulmonares, enfermedades cardíacas, niveles bajos de glóbulos rojos (anemia), bubba condición física, sobrepeso, tabaquismo, etc. Es posible que frias médico no pueda determinar la causa de frias dificultad para respirar después del examen. En abhilash lowell, es importante que acuda a shahab ryan de seguimiento con frias médico de cabecera según las indicaciones. Si le recetaron medicamentos, tómelos según las indicaciones sulaiman todo el tiempo indicado. Evite el tabaco.    BUSQUE ATENCIÓN MÉDICA INMEDIATA SI TIENE ALGUNO DE LOS SIGUIENTES SÍNTOMAS: empeoramiento de la dificultad para respirar, dolor en el pecho, dolor de espalda, dolor abdominal, fiebre, tos con reina, mareos.        Follow up with PCP within 1-2 days   Follow up with OBGYN as you have planned     Return if new or worsening symptoms     Shortness of breath    Shortness of breath (dyspnea) means you have trouble breathing and could indicate a medical problem. Causes include lung disease, heart disease, low amount of red blood cells (anemia), poor physical fitness, being overweight, smoking, etc. Your health care provider today may not be able to find a cause for your shortness of breath after your exam. In this case, it is important to have a follow-up exam with your primary care physician as instructed. If medicines were prescribed, take them as directed for the full length of time directed. Refrain from tobacco products.    SEEK IMMEDIATE MEDICAL CARE IF YOU HAVE ANY OF THE FOLLOWING SYMPTOMS: worsening shortness of breath, chest pain, back pain, abdominal pain, fever, coughing up blood, lightheadedness/dizziness.

## 2025-03-30 NOTE — ED PROVIDER NOTE - CLINICAL SUMMARY MEDICAL DECISION MAKING FREE TEXT BOX
39 year old female with no med hx presented to ED c/o sob, weakness, tiredness x 5 days.ddx: anxiety, pe, ischemia

## 2025-03-31 VITALS
RESPIRATION RATE: 17 BRPM | SYSTOLIC BLOOD PRESSURE: 112 MMHG | DIASTOLIC BLOOD PRESSURE: 72 MMHG | OXYGEN SATURATION: 98 % | HEART RATE: 69 BPM

## 2025-03-31 PROBLEM — I26.99 OTHER PULMONARY EMBOLISM WITHOUT ACUTE COR PULMONALE: Chronic | Status: ACTIVE | Noted: 2020-04-07

## 2025-03-31 LAB — T PALLIDUM AB TITR SER: NEGATIVE — SIGNIFICANT CHANGE UP

## 2025-03-31 PROCEDURE — 85025 COMPLETE CBC W/AUTO DIFF WBC: CPT

## 2025-03-31 PROCEDURE — 85379 FIBRIN DEGRADATION QUANT: CPT

## 2025-03-31 PROCEDURE — 93010 ELECTROCARDIOGRAM REPORT: CPT

## 2025-03-31 PROCEDURE — T1013: CPT

## 2025-03-31 PROCEDURE — 86703 HIV-1/HIV-2 1 RESULT ANTBDY: CPT

## 2025-03-31 PROCEDURE — 99285 EMERGENCY DEPT VISIT HI MDM: CPT | Mod: 25

## 2025-03-31 PROCEDURE — 87591 N.GONORRHOEAE DNA AMP PROB: CPT

## 2025-03-31 PROCEDURE — 71046 X-RAY EXAM CHEST 2 VIEWS: CPT

## 2025-03-31 PROCEDURE — 80053 COMPREHEN METABOLIC PANEL: CPT

## 2025-03-31 PROCEDURE — 84484 ASSAY OF TROPONIN QUANT: CPT

## 2025-03-31 PROCEDURE — 93005 ELECTROCARDIOGRAM TRACING: CPT

## 2025-03-31 PROCEDURE — 86780 TREPONEMA PALLIDUM: CPT

## 2025-03-31 PROCEDURE — 87491 CHLMYD TRACH DNA AMP PROBE: CPT

## 2025-03-31 PROCEDURE — 36415 COLL VENOUS BLD VENIPUNCTURE: CPT

## 2025-04-01 LAB
C TRACH RRNA SPEC QL NAA+PROBE: SIGNIFICANT CHANGE UP
N GONORRHOEA RRNA SPEC QL NAA+PROBE: SIGNIFICANT CHANGE UP